# Patient Record
Sex: MALE | Race: ASIAN | NOT HISPANIC OR LATINO | URBAN - METROPOLITAN AREA
[De-identification: names, ages, dates, MRNs, and addresses within clinical notes are randomized per-mention and may not be internally consistent; named-entity substitution may affect disease eponyms.]

---
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
## 2021-03-16 ENCOUNTER — EMERGENCY (EMERGENCY)
Facility: HOSPITAL | Age: 21
LOS: 0 days | Discharge: PSYCHIATRIC OR SUBSTANCE USE FACILITY | End: 2021-03-17
Attending: EMERGENCY MEDICINE
Payer: COMMERCIAL

## 2021-03-16 VITALS
RESPIRATION RATE: 17 BRPM | OXYGEN SATURATION: 100 % | TEMPERATURE: 98 F | HEART RATE: 98 BPM | HEIGHT: 70 IN | SYSTOLIC BLOOD PRESSURE: 134 MMHG | WEIGHT: 145.06 LBS | DIASTOLIC BLOOD PRESSURE: 79 MMHG

## 2021-03-16 DIAGNOSIS — F60.3 BORDERLINE PERSONALITY DISORDER: ICD-10-CM

## 2021-03-16 DIAGNOSIS — F29 UNSPECIFIED PSYCHOSIS NOT DUE TO A SUBSTANCE OR KNOWN PHYSIOLOGICAL CONDITION: ICD-10-CM

## 2021-03-16 DIAGNOSIS — Z91.010 ALLERGY TO PEANUTS: ICD-10-CM

## 2021-03-16 DIAGNOSIS — Z91.012 ALLERGY TO EGGS: ICD-10-CM

## 2021-03-16 DIAGNOSIS — F03.90 UNSPECIFIED DEMENTIA WITHOUT BEHAVIORAL DISTURBANCE: ICD-10-CM

## 2021-03-16 DIAGNOSIS — Z20.822 CONTACT WITH AND (SUSPECTED) EXPOSURE TO COVID-19: ICD-10-CM

## 2021-03-16 DIAGNOSIS — F90.9 ATTENTION-DEFICIT HYPERACTIVITY DISORDER, UNSPECIFIED TYPE: ICD-10-CM

## 2021-03-16 DIAGNOSIS — F84.0 AUTISTIC DISORDER: ICD-10-CM

## 2021-03-16 DIAGNOSIS — F30.9 MANIC EPISODE, UNSPECIFIED: ICD-10-CM

## 2021-03-16 DIAGNOSIS — F63.9 IMPULSE DISORDER, UNSPECIFIED: ICD-10-CM

## 2021-03-16 DIAGNOSIS — H54.61 UNQUALIFIED VISUAL LOSS, RIGHT EYE, NORMAL VISION LEFT EYE: ICD-10-CM

## 2021-03-16 LAB
ALBUMIN SERPL ELPH-MCNC: 3.7 G/DL — SIGNIFICANT CHANGE UP (ref 3.3–5)
ALP SERPL-CCNC: 152 U/L — HIGH (ref 40–120)
ALT FLD-CCNC: 46 U/L — SIGNIFICANT CHANGE UP (ref 12–78)
ANION GAP SERPL CALC-SCNC: 5 MMOL/L — SIGNIFICANT CHANGE UP (ref 5–17)
APAP SERPL-MCNC: < 2 UG/ML (ref 10–30)
APPEARANCE UR: CLEAR — SIGNIFICANT CHANGE UP
AST SERPL-CCNC: 29 U/L — SIGNIFICANT CHANGE UP (ref 15–37)
BASOPHILS # BLD AUTO: 0.01 K/UL — SIGNIFICANT CHANGE UP (ref 0–0.2)
BASOPHILS NFR BLD AUTO: 0.1 % — SIGNIFICANT CHANGE UP (ref 0–2)
BILIRUB SERPL-MCNC: 0.3 MG/DL — SIGNIFICANT CHANGE UP (ref 0.2–1.2)
BILIRUB UR-MCNC: NEGATIVE — SIGNIFICANT CHANGE UP
BUN SERPL-MCNC: 12 MG/DL — SIGNIFICANT CHANGE UP (ref 7–23)
CALCIUM SERPL-MCNC: 9.2 MG/DL — SIGNIFICANT CHANGE UP (ref 8.5–10.1)
CHLORIDE SERPL-SCNC: 103 MMOL/L — SIGNIFICANT CHANGE UP (ref 96–108)
CO2 SERPL-SCNC: 29 MMOL/L — SIGNIFICANT CHANGE UP (ref 22–31)
COLOR SPEC: YELLOW — SIGNIFICANT CHANGE UP
CREAT SERPL-MCNC: 0.7 MG/DL — SIGNIFICANT CHANGE UP (ref 0.5–1.3)
DIFF PNL FLD: NEGATIVE — SIGNIFICANT CHANGE UP
EOSINOPHIL # BLD AUTO: 0.35 K/UL — SIGNIFICANT CHANGE UP (ref 0–0.5)
EOSINOPHIL NFR BLD AUTO: 3.9 % — SIGNIFICANT CHANGE UP (ref 0–6)
ETHANOL SERPL-MCNC: <10 MG/DL — SIGNIFICANT CHANGE UP (ref 0–10)
GLUCOSE SERPL-MCNC: 77 MG/DL — SIGNIFICANT CHANGE UP (ref 70–99)
GLUCOSE UR QL: NEGATIVE — SIGNIFICANT CHANGE UP
HCT VFR BLD CALC: 41.3 % — SIGNIFICANT CHANGE UP (ref 39–50)
HGB BLD-MCNC: 14.1 G/DL — SIGNIFICANT CHANGE UP (ref 13–17)
IMM GRANULOCYTES NFR BLD AUTO: 0.3 % — SIGNIFICANT CHANGE UP (ref 0–1.5)
KETONES UR-MCNC: NEGATIVE — SIGNIFICANT CHANGE UP
LEUKOCYTE ESTERASE UR-ACNC: NEGATIVE — SIGNIFICANT CHANGE UP
LYMPHOCYTES # BLD AUTO: 2.07 K/UL — SIGNIFICANT CHANGE UP (ref 1–3.3)
LYMPHOCYTES # BLD AUTO: 23.2 % — SIGNIFICANT CHANGE UP (ref 13–44)
MCHC RBC-ENTMCNC: 32.2 PG — SIGNIFICANT CHANGE UP (ref 27–34)
MCHC RBC-ENTMCNC: 34.1 GM/DL — SIGNIFICANT CHANGE UP (ref 32–36)
MCV RBC AUTO: 94.3 FL — SIGNIFICANT CHANGE UP (ref 80–100)
MONOCYTES # BLD AUTO: 1.18 K/UL — HIGH (ref 0–0.9)
MONOCYTES NFR BLD AUTO: 13.2 % — SIGNIFICANT CHANGE UP (ref 2–14)
NEUTROPHILS # BLD AUTO: 5.27 K/UL — SIGNIFICANT CHANGE UP (ref 1.8–7.4)
NEUTROPHILS NFR BLD AUTO: 59.3 % — SIGNIFICANT CHANGE UP (ref 43–77)
NITRITE UR-MCNC: NEGATIVE — SIGNIFICANT CHANGE UP
PCP SPEC-MCNC: SIGNIFICANT CHANGE UP
PH UR: 6 — SIGNIFICANT CHANGE UP (ref 5–8)
PLATELET # BLD AUTO: 211 K/UL — SIGNIFICANT CHANGE UP (ref 150–400)
POTASSIUM SERPL-MCNC: 3.9 MMOL/L — SIGNIFICANT CHANGE UP (ref 3.5–5.3)
POTASSIUM SERPL-SCNC: 3.9 MMOL/L — SIGNIFICANT CHANGE UP (ref 3.5–5.3)
PROT SERPL-MCNC: 7.8 GM/DL — SIGNIFICANT CHANGE UP (ref 6–8.3)
PROT UR-MCNC: NEGATIVE — SIGNIFICANT CHANGE UP
RBC # BLD: 4.38 M/UL — SIGNIFICANT CHANGE UP (ref 4.2–5.8)
RBC # FLD: 12.9 % — SIGNIFICANT CHANGE UP (ref 10.3–14.5)
SALICYLATES SERPL-MCNC: <1.7 MG/DL — LOW (ref 2.8–20)
SARS-COV-2 RNA SPEC QL NAA+PROBE: SIGNIFICANT CHANGE UP
SODIUM SERPL-SCNC: 137 MMOL/L — SIGNIFICANT CHANGE UP (ref 135–145)
SP GR SPEC: 1.01 — SIGNIFICANT CHANGE UP (ref 1.01–1.02)
UROBILINOGEN FLD QL: NEGATIVE — SIGNIFICANT CHANGE UP
VALPROATE SERPL-MCNC: 72 UG/ML — SIGNIFICANT CHANGE UP (ref 50–100)
WBC # BLD: 8.91 K/UL — SIGNIFICANT CHANGE UP (ref 3.8–10.5)
WBC # FLD AUTO: 8.91 K/UL — SIGNIFICANT CHANGE UP (ref 3.8–10.5)

## 2021-03-16 PROCEDURE — 85025 COMPLETE CBC W/AUTO DIFF WBC: CPT

## 2021-03-16 PROCEDURE — 81003 URINALYSIS AUTO W/O SCOPE: CPT

## 2021-03-16 PROCEDURE — 96372 THER/PROPH/DIAG INJ SC/IM: CPT | Mod: XU

## 2021-03-16 PROCEDURE — 80164 ASSAY DIPROPYLACETIC ACD TOT: CPT

## 2021-03-16 PROCEDURE — 80053 COMPREHEN METABOLIC PANEL: CPT

## 2021-03-16 PROCEDURE — 99285 EMERGENCY DEPT VISIT HI MDM: CPT

## 2021-03-16 PROCEDURE — 80307 DRUG TEST PRSMV CHEM ANLYZR: CPT

## 2021-03-16 PROCEDURE — 90792 PSYCH DIAG EVAL W/MED SRVCS: CPT | Mod: 95

## 2021-03-16 PROCEDURE — U0005: CPT

## 2021-03-16 PROCEDURE — 99285 EMERGENCY DEPT VISIT HI MDM: CPT | Mod: 25

## 2021-03-16 PROCEDURE — U0003: CPT

## 2021-03-16 PROCEDURE — 36415 COLL VENOUS BLD VENIPUNCTURE: CPT

## 2021-03-16 RX ORDER — DIVALPROEX SODIUM 500 MG/1
500 TABLET, DELAYED RELEASE ORAL ONCE
Refills: 0 | Status: DISCONTINUED | OUTPATIENT
Start: 2021-03-16 | End: 2021-03-16

## 2021-03-16 RX ORDER — HALOPERIDOL DECANOATE 100 MG/ML
5 INJECTION INTRAMUSCULAR ONCE
Refills: 0 | Status: COMPLETED | OUTPATIENT
Start: 2021-03-16 | End: 2021-03-16

## 2021-03-16 RX ORDER — CHLORPROMAZINE HCL 10 MG
200 TABLET ORAL ONCE
Refills: 0 | Status: DISCONTINUED | OUTPATIENT
Start: 2021-03-16 | End: 2021-03-16

## 2021-03-16 RX ADMIN — HALOPERIDOL DECANOATE 5 MILLIGRAM(S): 100 INJECTION INTRAMUSCULAR at 21:48

## 2021-03-16 RX ADMIN — Medication 2 MILLIGRAM(S): at 21:48

## 2021-03-16 NOTE — ED BEHAVIORAL HEALTH ASSESSMENT NOTE - NS ED BHA PLAN PSYCHIATRIC ISSUES2 FT
would continue VPA and chlorpromazine, defer further changes/additions pending discussion with outpatient psychiatrist as patient has had many med trials and has marked polypharmacy at this time with possible SE (?parkinsonism re shuffling gait)
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
## 2021-03-16 NOTE — ED ADULT NURSE NOTE - NS ED NURSE RECORD ANOTHER VITAL SIGN
Problem: DISCHARGE PLANNING - CARE MANAGEMENT  Goal: Discharge to post-acute care or home with appropriate resources  INTERVENTIONS:  - Conduct assessment to determine patient/family and health care team treatment goals, and need for post-acute services based on payer coverage, community resources, and patient preferences, and barriers to discharge  - Address psychosocial, clinical, and financial barriers to discharge as identified in assessment in conjunction with the patient/family and health care team  - Arrange appropriate level of post-acute services according to patient's   needs and preference and payer coverage in collaboration with the physician and health care team  - Communicate with and update the patient/family, physician, and health care team regarding progress on the discharge plan  - Arrange appropriate transportation to post-acute venues  Return to home independently  Outcome: Progressing Yes
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
## 2021-03-16 NOTE — ED BEHAVIORAL HEALTH ASSESSMENT NOTE - OTHER
special ed program restless defer wearing hospital gown, multiple times uncovering trunk and genitalia apparently unaware during interview
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
## 2021-03-16 NOTE — ED BEHAVIORAL HEALTH ASSESSMENT NOTE - RISK ASSESSMENT
chronic rf: male, ADHD, impulsivity  acute rf: increased irritability, recent outbursts, HI  pf: family support Low Acute Suicide Risk
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
## 2021-03-16 NOTE — ED BEHAVIORAL HEALTH ASSESSMENT NOTE - CURRENT MEDICATION
CoQ10, probiotic, Depakote 500 mg BID, olanzapine 2.5 mg BID, chlorpromazine 200 mg qhs, Haldol 1 mg qhs

## 2021-03-16 NOTE — ED PROVIDER NOTE - UNABLE TO OBTAIN
Dementia Complete history unobtainable secondary to pt Hx of Autism. ROS unobtainable secondary to pt Hx of Autism.
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
## 2021-03-16 NOTE — ED ADULT NURSE REASSESSMENT NOTE - NS ED NURSE REASSESS COMMENT FT1
belongings brought to security as per protocol. patient wanded by security. 1:1 maintained for patient safety.
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
## 2021-03-16 NOTE — ED BEHAVIORAL HEALTH ASSESSMENT NOTE - ADDITIONAL DETAILS ALL
Patient left message on nurse triage line asking questions on medication.     Returned patient phone call. Reviewed using insulin syringes to inject microdose lupron and 3 ml for menopur. Patient states she dropped one menopur vials on the ground and it broke. Patient aware she may have to purchase another one but will wait until later in the cycle.    HPI
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
## 2021-03-16 NOTE — ED ADULT NURSE NOTE - NSIMPLEMENTINTERV_GEN_ALL_ED
Implemented All Universal Safety Interventions:  [place] to call system. Call bell, personal items and telephone within reach. Instruct patient to call for assistance. Room bathroom lighting operational. Non-slip footwear when patient is off stretcher. Physically safe environment: no spills, clutter or unnecessary equipment. Stretcher in lowest position, wheels locked, appropriate side rails in place.

## 2021-03-17 ENCOUNTER — INPATIENT (INPATIENT)
Facility: HOSPITAL | Age: 21
LOS: 8 days | Discharge: HOME | End: 2021-03-26
Attending: PSYCHIATRY & NEUROLOGY | Admitting: PSYCHIATRY & NEUROLOGY
Payer: COMMERCIAL

## 2021-03-17 VITALS
SYSTOLIC BLOOD PRESSURE: 112 MMHG | OXYGEN SATURATION: 100 % | TEMPERATURE: 97 F | DIASTOLIC BLOOD PRESSURE: 62 MMHG | HEART RATE: 80 BPM | RESPIRATION RATE: 16 BRPM

## 2021-03-17 VITALS
TEMPERATURE: 97 F | HEART RATE: 106 BPM | DIASTOLIC BLOOD PRESSURE: 66 MMHG | RESPIRATION RATE: 18 BRPM | SYSTOLIC BLOOD PRESSURE: 107 MMHG

## 2021-03-17 DIAGNOSIS — F29 UNSPECIFIED PSYCHOSIS NOT DUE TO A SUBSTANCE OR KNOWN PHYSIOLOGICAL CONDITION: ICD-10-CM

## 2021-03-17 DIAGNOSIS — F33.9 MAJOR DEPRESSIVE DISORDER, RECURRENT, UNSPECIFIED: ICD-10-CM

## 2021-03-17 PROBLEM — F84.0 AUTISTIC DISORDER: Chronic | Status: ACTIVE | Noted: 2018-01-13

## 2021-03-17 PROCEDURE — 99222 1ST HOSP IP/OBS MODERATE 55: CPT

## 2021-03-17 RX ORDER — OLANZAPINE 15 MG/1
2.5 TABLET, FILM COATED ORAL
Refills: 0 | Status: DISCONTINUED | OUTPATIENT
Start: 2021-03-17 | End: 2021-03-17

## 2021-03-17 RX ORDER — OLANZAPINE 15 MG/1
2.5 TABLET, FILM COATED ORAL ONCE
Refills: 0 | Status: COMPLETED | OUTPATIENT
Start: 2021-03-17 | End: 2021-03-17

## 2021-03-17 RX ORDER — DIVALPROEX SODIUM 500 MG/1
500 TABLET, DELAYED RELEASE ORAL
Refills: 0 | Status: DISCONTINUED | OUTPATIENT
Start: 2021-03-17 | End: 2021-03-18

## 2021-03-17 RX ORDER — OLANZAPINE 15 MG/1
5 TABLET, FILM COATED ORAL
Refills: 0 | Status: DISCONTINUED | OUTPATIENT
Start: 2021-03-17 | End: 2021-03-22

## 2021-03-17 RX ORDER — CHLORPROMAZINE HCL 10 MG
200 TABLET ORAL AT BEDTIME
Refills: 0 | Status: DISCONTINUED | OUTPATIENT
Start: 2021-03-17 | End: 2021-03-26

## 2021-03-17 RX ORDER — DIVALPROEX SODIUM 500 MG/1
500 TABLET, DELAYED RELEASE ORAL ONCE
Refills: 0 | Status: COMPLETED | OUTPATIENT
Start: 2021-03-17 | End: 2021-03-17

## 2021-03-17 RX ORDER — CHLORPROMAZINE HCL 10 MG
100 TABLET ORAL ONCE
Refills: 0 | Status: COMPLETED | OUTPATIENT
Start: 2021-03-17 | End: 2021-03-17

## 2021-03-17 RX ORDER — CHLORPROMAZINE HCL 10 MG
50 TABLET ORAL ONCE
Refills: 0 | Status: DISCONTINUED | OUTPATIENT
Start: 2021-03-17 | End: 2021-03-26

## 2021-03-17 RX ORDER — CHLORPROMAZINE HCL 10 MG
50 TABLET ORAL EVERY 4 HOURS
Refills: 0 | Status: DISCONTINUED | OUTPATIENT
Start: 2021-03-17 | End: 2021-03-26

## 2021-03-17 RX ADMIN — Medication 50 MILLIGRAM(S): at 18:11

## 2021-03-17 RX ADMIN — Medication 2 MILLIGRAM(S): at 18:12

## 2021-03-17 RX ADMIN — OLANZAPINE 5 MILLIGRAM(S): 15 TABLET, FILM COATED ORAL at 20:42

## 2021-03-17 RX ADMIN — Medication 2 MILLIGRAM(S): at 11:00

## 2021-03-17 RX ADMIN — Medication 50 MILLIGRAM(S): at 09:35

## 2021-03-17 RX ADMIN — OLANZAPINE 2.5 MILLIGRAM(S): 15 TABLET, FILM COATED ORAL at 09:40

## 2021-03-17 RX ADMIN — Medication 200 MILLIGRAM(S): at 20:42

## 2021-03-17 RX ADMIN — Medication 3 MILLIGRAM(S): at 13:51

## 2021-03-17 RX ADMIN — DIVALPROEX SODIUM 500 MILLIGRAM(S): 500 TABLET, DELAYED RELEASE ORAL at 20:42

## 2021-03-17 RX ADMIN — DIVALPROEX SODIUM 500 MILLIGRAM(S): 500 TABLET, DELAYED RELEASE ORAL at 09:35

## 2021-03-17 RX ADMIN — Medication 100 MILLIGRAM(S): at 13:51

## 2021-03-17 RX ADMIN — OLANZAPINE 5 MILLIGRAM(S): 15 TABLET, FILM COATED ORAL at 11:00

## 2021-03-17 NOTE — BH INPATIENT PSYCHIATRY ASSESSMENT NOTE - NSDCCRITERIA_PSY_ALL_CORE
When pt is no longer an acute or imminent risk of harm to self or others, and is able to care for self safely, pt may then be discharged.

## 2021-03-17 NOTE — BH INPATIENT PSYCHIATRY ASSESSMENT NOTE - MODIFICATIONS
Modifications were made to above student note where appropriate and/or are addressed below in case summary.
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
## 2021-03-17 NOTE — BH PATIENT PROFILE - NSINDCRISISSIGNS_PSY_ALL_CORE
Becoming argumentative/Slamming doors/Clenching fists/Pacing/Scratching self/Clenching jaw/Punching walls/Self-injury/Yelling

## 2021-03-17 NOTE — BH INPATIENT PSYCHIATRY ASSESSMENT NOTE - CASE SUMMARY
20M w/pphx of autism, OCD, tics.  Admitted with worsening behaviors for months leading to aggression in home.  Awaiting collateral to formulate treatment plan.
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
## 2021-03-17 NOTE — PSYCHIATRIC REHAB INITIAL EVALUATION - NSBHPRRECOMMEND_PSY_ALL_CORE
Writer attempted to meet with patient in order to orient patient to unit, and introduce patient to psychiatric staff and department functions. However, writer is completing this assessment from chart due to patient's symptomatology at the time of this session. Writer selected an appropriate psychiatric rehabilitation goal. Psychiatric rehabilitation staff will continue to engage patient daily in order to develop therapeutic rapport. In response to COVID19, unit programming will be re-evaluated on a consistent basis in effort to maintain safety guidelines.

## 2021-03-17 NOTE — BH INPATIENT PSYCHIATRY ASSESSMENT NOTE - CURRENT MEDICATION
MEDICATIONS  (STANDING):  chlorproMAZINE    Tablet 200 milliGRAM(s) Oral at bedtime  diVALproex  milliGRAM(s) Oral two times a day  OLANZapine 5 milliGRAM(s) Oral two times a day    MEDICATIONS  (PRN):  chlorproMAZINE    Injectable 50 milliGRAM(s) IntraMuscular once PRN agitation  chlorproMAZINE    Tablet 50 milliGRAM(s) Oral every 4 hours PRN agitation  LORazepam     Tablet 2 milliGRAM(s) Oral every 4 hours PRN anxiety/agitation  LORazepam   Injectable 2 milliGRAM(s) IntraMuscular once PRN agitation

## 2021-03-17 NOTE — BH INPATIENT PSYCHIATRY ASSESSMENT NOTE - PAST PSYCHOTROPIC MEDICATION
-past med trials: Topamax, intuniv, Prozac, propranolol, ambien, luvox (discontinued on 3/14/21 for gait disturbance)  -current meds: CoQ10, probiotic, Depakote 500 mg BID, olanzapine 2.5 mg BID, chlorpromazine 200 mg qhs, Haldol 1 mg qhs
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
## 2021-03-17 NOTE — BH PATIENT PROFILE - NSELOPEMENTRISKBEHAVE_PSY_ALL_CORE
Verbal statements of intent to leave the unit without permission/Making attempts to escape from the unit/Checking the doors and/or windows/Watching the exit doors

## 2021-03-17 NOTE — PSYCHIATRIC REHAB INITIAL EVALUATION - NSBHALCSUBTREAT_PSY_ALL_CORE
Per chart, patient was currently receiving outpatient therapy, and was taking psychotropic medication prior to admission

## 2021-03-17 NOTE — BH INPATIENT PSYCHIATRY ASSESSMENT NOTE - NSBHCHARTREVIEWVS_PSY_A_CORE FT
Vital Signs Last 24 Hrs  T(C): 36.2 (17 Mar 2021 09:09), Max: 36.7 (16 Mar 2021 23:48)  T(F): 97.1 (17 Mar 2021 09:09), Max: 98.1 (17 Mar 2021 01:23)  HR: 106 (17 Mar 2021 09:09) (62 - 106)  BP: 107/66 (17 Mar 2021 09:09) (107/66 - 134/79)  BP(mean): --  RR: 18 (17 Mar 2021 09:09) (15 - 20)  SpO2: 100% (17 Mar 2021 07:57) (100% - 100%)

## 2021-03-17 NOTE — BH INPATIENT PSYCHIATRY ASSESSMENT NOTE - MSE UNSTRUCTURED FT
Appearance: well groomed in hospital gown, appears stated age  Behavior: does not make eye contact, hunched over in bed eating with hands, guarded  Speech: soft volume, increased latency, occasional verbal tic  Motor: psychomotor agitation, no gait disturbance observed  Mood: dysphoric  Affect: anxious, agitated,  constricted   Thought Process: preservation of thought (going home)  Thought Content: denies SI, HI, AH/VH, delusions  Judgement: poor  Insight: poor  Impulse control: tenuous      Appearance: well groomed in hospital gown, appears stated age  Behavior: does not make eye contact, hunched over in bed eating with hands, guarded  Speech: soft volume, increased latency, occasional verbal tic  Motor: psychomotor agitation, gait disturbance observed ambulating on unit   Mood: dysphoric  Affect: anxious, agitated,  constricted   Thought Process: preservation of thought (going home)  Thought Content: denies SI, HI, AH/VH, delusions  Judgement: poor  Insight: poor  Impulse control: tenuous

## 2021-03-17 NOTE — BH CHART NOTE - NSEVENTNOTEFT_PSY_ALL_CORE
Pt began screaming, banging on nurses station window.  Then became physically combative with [facility] and responding staff, pt was pushing, punching, spitting.  Unable to redirect.  4 Point restraints at 13:40.  Chlorpromazine and Ativan IMs.    Pt in restraints continues to yell that he wants to go home and to call father.  No circulatory compromise noted in 4 extremities.  Pt respirating adequately.

## 2021-03-17 NOTE — BH INPATIENT PSYCHIATRY ASSESSMENT NOTE - OTHER PAST PSYCHIATRIC HISTORY (INCLUDE DETAILS REGARDING ONSET, COURSE OF ILLNESS, INPATIENT/OUTPATIENT TREATMENT)
PPHx:  -dx: ASD (dx around age 2), OCD, tics (vocal screams, hits self)  -hx learning disability  -years ago became “manic” and aggressive, went to [place], was discharged, got worse, brought back and was eventually admitted at [facility] for med changes

## 2021-03-17 NOTE — BH PATIENT PROFILE - NSPROSPIRITUALVALUESFT_GEN_A_NUR
Patient is religiously focused/delusional , feels that he is the Hinduism Messiah, verbalizes paranoid ideation about the [employer] during nursing assessment.
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
## 2021-03-17 NOTE — BH PATIENT PROFILE - HOME MEDICATIONS
amoxicillin-clavulanate 875 mg-125 mg oral tablet , 1 tab(s) orally 2 times a day  Zoloft 25 mg oral tablet , 1 tab(s) orally once a day  Abilify 5 mg oral tablet , 1 tab(s) orally once a day  Intuniv 4 mg oral tablet, extended release , 1 tab(s) orally once a day (in the morning)  Topamax 50 mg oral tablet , 1 tab(s) orally 2 times a day

## 2021-03-17 NOTE — BH INPATIENT PSYCHIATRY ASSESSMENT NOTE - VIOLENCE RISK FACTORS:
Feeling of being under threat and being unable to control threat/History of violence prior to age 18/Violent ideation/threat/speech/Affective dysregulation/Impulsivity/Lack of insight into violence risk/need for treatment

## 2021-03-17 NOTE — BH INPATIENT PSYCHIATRY ASSESSMENT NOTE - NSBHMETABOLIC_PSY_ALL_CORE_FT
BMI: BMI (kg/m2): 20.8 (03-16-21 @ 17:28)  HbA1c:   Glucose:   BP: 107/66 (03-17-21 @ 09:09) (107/66 - 107/66)  Lipid Panel:

## 2021-03-18 PROCEDURE — 99232 SBSQ HOSP IP/OBS MODERATE 35: CPT

## 2021-03-18 RX ORDER — DIVALPROEX SODIUM 500 MG/1
750 TABLET, DELAYED RELEASE ORAL
Refills: 0 | Status: DISCONTINUED | OUTPATIENT
Start: 2021-03-18 | End: 2021-03-26

## 2021-03-18 RX ORDER — CHLORPROMAZINE HCL 10 MG
100 TABLET ORAL ONCE
Refills: 0 | Status: COMPLETED | OUTPATIENT
Start: 2021-03-18 | End: 2021-03-18

## 2021-03-18 RX ADMIN — OLANZAPINE 5 MILLIGRAM(S): 15 TABLET, FILM COATED ORAL at 09:48

## 2021-03-18 RX ADMIN — Medication 2 MILLIGRAM(S): at 18:13

## 2021-03-18 RX ADMIN — DIVALPROEX SODIUM 500 MILLIGRAM(S): 500 TABLET, DELAYED RELEASE ORAL at 09:48

## 2021-03-18 RX ADMIN — Medication 3 MILLIGRAM(S): at 15:27

## 2021-03-18 RX ADMIN — Medication 50 MILLIGRAM(S): at 09:48

## 2021-03-18 RX ADMIN — Medication 50 MILLIGRAM(S): at 13:52

## 2021-03-18 RX ADMIN — Medication 2 MILLIGRAM(S): at 12:41

## 2021-03-18 RX ADMIN — Medication 100 MILLIGRAM(S): at 15:27

## 2021-03-18 NOTE — BH SOCIAL WORK INITIAL PSYCHOSOCIAL EVALUATION - LEGAL HELP
PHONE VISIT    I tried to connect with pt twice virtually but EMR read \"Hmm we're having trouble connecting you. Reload the page before trying again. \" Per pt, she has received 5 text invites but EMR says that I am offline. Once we did connect, there was no audio. Consent:  She and/or health care decision maker is aware that that she may receive a bill for this telephone service, depending on her insurance coverage, and has provided verbal consent to proceed: Yes    HISTORY OF PRESENT ILLNESS  Harjit Vences is a 46 y.o. female presents with Ear Pain (about a week now) and Sinus Pain    Agree with nurse note. Pt was evaluated via telephone. Pt complains of sinus congestion, sinus pressure behind the R eye, yellow nasal drainage, post nasal drainage, itchy nose, sneezing, and R ear pain x 1 week. She takes Zyrtec daily, Singulair 10 mg qPM, and Astelin BID. She uses albuterol hfa, Pulmicort, and Duo-Neb prn. She is not able to take her temperature today. Patient denies fever, chills, dizziness, sore throat, headache, itchy or watery eyes, chest pain or tightness, SOB, wheezing, cough, GI symptoms, bladder symptoms and body aches. She notes her sister was just dx'd with an ear infection and the doctor was surprised her ear drum did not burst. She is concerned she will get an ear infection like this. She has an allergy to sulfa drugs and penicillin. She takes Doxy daily for acne rosacea. Pt requests a refill of Atarax 25 mg for pruritic dermatitis. She takes 2-3 pills a day. Her bottle says 0 refills. Per chart review, a year supply was sent on 12/26/2019. Written by tracy Alvares, as dictated by DO. IMER Brunson    Review of Systems negative except as noted above in HPI.     ALLERGIES:    Allergies   Allergen Reactions    Latex Rash    Junel 1.5/30 (21) [Norethindrone Ac-Eth Estradiol] Swelling     L FOOT DUE TO NEW DVT    Other Medication Hives and Rash     SUN  Pcn [Penicillins] Hives    Prednisone Hives    Sulfa (Sulfonamide Antibiotics) Hives    Garamycin [Gentamicin] Other (comments)     Itchy eyes due to sulfate    Metformin Diarrhea       CURRENT MEDICATIONS:    Outpatient Medications Marked as Taking for the 5/6/20 encounter (Virtual Visit) with Asia Stock, DO   Medication Sig Dispense Refill    hydrOXYzine HCL (ATARAX) 25 mg tablet Take 2 Tabs by mouth every eight (8) hours as needed (urticaria). Indications: hives 270 Tab 3    doxycycline (ADOXA) 100 mg tablet Take 1 Tab by mouth two (2) times a day for 7 days. Indications: inflammation of the tissue lining the sinuses 14 Tab 0    NAFTIN 2 % gel Use as directed  Indications: athlete's foot 60 g 2    clobetasol (TEMOVATE) 0.05 % topical cream as needed. 1    albuterol (PROVENTIL HFA, VENTOLIN HFA, PROAIR HFA) 90 mcg/actuation inhaler Take 2 Puffs by inhalation every four (4) hours as needed for Wheezing. Indications: bronchospasm prevention 3 Inhaler 1    levothyroxine (SYNTHROID) 125 mcg tablet Take 1 Tab by mouth Daily (before breakfast). Indications: a condition with low thyroid hormone levels 90 Tab 3    albuterol-ipratropium (DUO-NEB) 2.5 mg-0.5 mg/3 ml nebu 3 mL by Nebulization route every six (6) hours as needed (breathing). 30 Nebule 5    montelukast (SINGULAIR) 10 mg tablet Take 1 Tab by mouth daily. 90 Tab 3    azelastine (ASTELIN) 137 mcg (0.1 %) nasal spray 2 Sprays by Both Nostrils route two (2) times a day. Use in each nostril as directed  Indications: Seasonal Runny Nose 3 Bottle 1    omeprazole (PRILOSEC) 40 mg capsule TAKE 1 CAPSULE BY MOUTH EVERY DAY 90 Cap 1    butalbital-acetaminophen-caffeine (FIORICET, ESGIC) -40 mg per tablet TAKE 1 TAB BY MOUTH EVERY SIX (6) HOURS AS NEEDED FOR PAIN OR HEADACHE. 40 Tab 0    Nebulizer & Compressor machine 1 Each by Other route four (4) times daily as needed.  1 Each 0    albuterol (PROVENTIL VENTOLIN) 2.5 mg /3 mL (0.083 %) nebulizer solution 6 mL by Nebulization route every four (4) hours as needed for Wheezing or Shortness of Breath. Indications: Acute Asthma Attack 24 Each 1    budesonide (PULMICORT) 1 mg/2 mL nbsp 2 mL by Nebulization route two (2) times a day. Indications: MAINTENANCE THERAPY FOR ASTHMA (Patient taking differently: 1,000 mcg by Nebulization route two (2) times daily as needed.) 1 Each 1    cyclobenzaprine (FLEXERIL) 10 mg tablet TAKE 1 TABLET BY MOUTH 3 TIMES A DAY AS NEEDED FOR MUSCLE SPASM 90 Tab 0    ALPRAZolam (XANAX) 0.5 mg tablet Take 1 Tab by mouth two (2) times daily as needed for Anxiety or Sleep. Max Daily Amount: 1 mg. Indications: anxiety 60 Tab 2    terconazole (TERAZOL 7) 0.4 % vaginal cream   6    LOCOID 0.1 % lotn   1    valACYclovir (VALTREX) 500 mg tablet   1    terconazole (TERAZOL 3) 80 mg vaginal suppository   4    multivitamin (ONE A DAY) tablet Take 1 tablet by mouth daily.  PROPYLENE GLYCOL//PF (SYSTANE, PF, OP) Apply  to eye four (4) times daily.  Emollient Combination No.32 (EPICERAM) Emul by Apply Externally route two (2) times a day. From Dr Brianne Carvajal. For Acne rosacea.  metroNIDAZOLE (METROGEL) 1 % topical gel Apply  to affected area two (2) times a day. Use a thin layer to affected areas after washing from Dr Brianne Carvajal   Indications: a skin condition on the cheeks and nose with a reddish rash and acne called acne rosacea      latanoprost (XALATAN) 0.005 % ophthalmic solution Administer 1 Drop to both eyes nightly.  LACTOBACILLUS/FOS/PECTIN (PROBIOTIC COMPLEX PO) Take  by mouth daily.  cholecalciferol, vitamin d3, (VITAMIN D) 1,000 unit tablet Take 1,000 Units by mouth daily. PAST MEDICAL HISTORY:    Past Medical History:   Diagnosis Date    Acne rosacea     Dr. Brianne Carvajal.  Allergy, unspecified not elsewhere classified childhood     Txd with immunotherapy.   Dr. Bere Tang Anemia NEC     borderline    Ankle sprain 2007    Right. Dr. Srinivasan Folres    Ankle sprain 11/2012    Right. Dr. Júnior Yang.  Asthma childhood    Chickenpox childhood    Chronic low back pain with right-sided sciatica     and SI joint dysfunction. Dr. Isael De.  Chronic otitis media     Dr. Ck Gannon Dry eye syndrome 2013    Dr. Buck Sandoval.  DVT of deep femoral vein ([address] 12/07/2018    Acute occlusive DVT of L deep femoral vein and Acute partially occlusive DVT of L Common Fem Vein and L Prox Fem Vein. Txd with Xarelto x 3 months. due to BCP. Dr. Mondragon May EBV infection 6/27/2011    Hearing loss     Mild high frequency sensorineuronal hearing loss. Dr. Tl Dougherty murmur     Hernia of abdominal wall 09/2003, 64/7129    umbilical.  Dr. Annamarie Hawthorne. Dr. Dena Charles    Hyperglycemia 2013    Hypothyroidism 06/2010    Dr. Grace Tello pressure increase 12/2011    Dr. Abi Bailey. Dr. Buck Sandoval. Dr. Destiny Correia.  Knee pain, left 04/2012    Left. Dr. Rocio Shaffer Measles childhood    Migraine 2017, 07/2019    w aura and facial paresthesia. Dr. Дмитрий Lopez    Mumps childhood    Plantar fasciitis, bilateral 2010    Dr. Acey Regan Ryleigh.Caldron Sciatica 2008    Right.  with OA. Dr. Laina Oliveira Tinnitus of right ear 2012    Dr. Verta Holstein:    Past Surgical History:   Procedure Laterality Date   Ion Vazquez  2011    Dr. Tez Curran.  HAND/FINGER SURGERY UNLISTED  09/2003    Right Index finger repair due to cut    HX HERNIA REPAIR  90/1263    Umbilical.  Dr. Annamarie Hawthorne.  HX HERNIA REPAIR  9/23/2011    recurrent umbilical.  Laparoscopy incisional.  Dr. Dena Charles.     HX TONSILLECTOMY  childhood    LAP,CHOLECYSTECTOMY  07/2001       FAMILY HISTORY:    Family History   Problem Relation Age of Onset    Hypertension Mother     Cancer Mother         small cell lung with bone mets to spine/MELANOMA    Cancer Father         melanoma    Arthritis-osteo Father     Colon Polyps Father     Diabetes Maternal Grandmother     Stroke Maternal Grandmother     Seizures Maternal Grandmother     Colon Polyps Maternal Grandmother     Breast Cancer Maternal Grandmother     Heart Disease Paternal Grandmother         heart failure    Heart Attack Paternal Grandmother     Asthma Paternal Grandmother     Depression Sister     Other Maternal Grandfather         Jose Dopp Ds       SOCIAL HISTORY:    Social History     Socioeconomic History    Marital status: SINGLE     Spouse name: Not on file    Number of children: Not on file    Years of education: Not on file    Highest education level: Not on file   Tobacco Use    Smoking status: Passive Smoke Exposure - Never Smoker    Smokeless tobacco: Never Used    Tobacco comment: lived with smoker dad and mom then step mom x 20 yrs   Substance and Sexual Activity    Alcohol use: Yes     Alcohol/week: 1.0 standard drinks     Types: 1 Glasses of wine per week     Frequency: Monthly or less     Drinks per session: 1 or 2     Binge frequency: Never     Comment: RARE    Drug use: No    Sexual activity: Yes     Partners: Male     Birth control/protection: Condom   Lifestyle    Physical activity     Days per week: 4 days     Minutes per session: 30 min    Stress:  Only a little       IMMUNIZATIONS:    Immunization History   Administered Date(s) Administered    Influenza Vaccine 10/15/2013, 11/07/2016, 11/03/2017, 10/01/2019    Influenza Vaccine (Quad) 11/03/2015    Influenza Vaccine (Quad) PF 11/20/2014, 11/07/2016, 11/12/2018    Influenza Vaccine Split 11/18/2011, 11/07/2012    Influenza Vaccine Whole 10/01/2010    TD Vaccine 09/01/2003    Tdap 11/03/2015         PHYSICAL EXAMINATION    Vital Signs    Visit Vitals  Ht 5' 1\" (1.549 m)   Wt 297 lb 12.8 oz (135.1 kg)   BMI 56.27 kg/m²       Weight Metrics 5/6/2020 12/4/2019 11/15/2019 7/30/2019 7/8/2019 7/2/2019 4/26/2019   Weight 297 lb 12.8 oz 298 lb 8 oz 300 lb 6.4 oz 300 lb 301 lb 303 lb 4.8 oz 312 lb   BMI 56.27 kg/m2 56.36 kg/m2 56.76 kg/m2 56.68 kg/m2 56.87 kg/m2 57.31 kg/m2 58.95 kg/m2       Unable to assess over the phone. DATA REVIEWED    Lab Results   Component Value Date/Time    WBC 7.7 11/08/2019 10:36 AM    HGB 12.1 11/08/2019 10:36 AM    HCT 37.5 11/08/2019 10:36 AM    PLATELET 448 20/74/8060 10:36 AM    MCV 89 11/08/2019 10:36 AM     Lab Results   Component Value Date/Time    Sodium 138 11/08/2019 10:36 AM    Potassium 4.7 11/08/2019 10:36 AM    Chloride 102 11/08/2019 10:36 AM    CO2 22 11/08/2019 10:36 AM    Anion gap 10 09/24/2011 02:30 AM    Glucose 89 11/08/2019 10:36 AM    BUN 13 11/08/2019 10:36 AM    Creatinine 0.71 11/08/2019 10:36 AM    BUN/Creatinine ratio 18 11/08/2019 10:36 AM    GFR est  11/08/2019 10:36 AM    GFR est non-AA 99 11/08/2019 10:36 AM    Calcium 8.8 11/08/2019 10:36 AM    Bilirubin, total 0.3 11/08/2019 10:36 AM    AST (SGOT) 15 11/08/2019 10:36 AM    Alk. phosphatase 108 11/08/2019 10:36 AM    Protein, total 6.3 11/08/2019 10:36 AM    Albumin 4.2 11/08/2019 10:36 AM    Globulin 3.4 05/19/2010 10:46 AM    A-G Ratio 2.0 11/08/2019 10:36 AM    ALT (SGPT) 23 11/08/2019 10:36 AM     Lab Results   Component Value Date/Time    Cholesterol, total 179 11/08/2019 10:36 AM    HDL Cholesterol 66 11/08/2019 10:36 AM    LDL, calculated 102 (H) 11/08/2019 10:36 AM    VLDL, calculated 11 11/08/2019 10:36 AM    Triglyceride 54 11/08/2019 10:36 AM    CHOL/HDL Ratio 2.5 05/19/2010 10:46 AM     Lab Results   Component Value Date/Time    Vitamin D 25-Hydroxy 32.2 08/11/2011 08:15 AM    VITAMIN D, 25-HYDROXY 27.5 (L) 11/08/2019 10:36 AM       Lab Results   Component Value Date/Time    Hemoglobin A1c 5.8 (H) 11/08/2019 10:36 AM     Lab Results   Component Value Date/Time    TSH 1.860 11/08/2019 10:36 AM     ASSESSMENT and PLAN      ICD-10-CM ICD-9-CM    1.  Acute non-recurrent sinusitis of other sinus J01.80 461.8 doxycycline (ADOXA) 100 mg tablet   2. Pruritic dermatitis L29.9 698.9 hydrOXYzine HCL (ATARAX) 25 mg tablet   3. Acne rosacea L71.9 695.3     stable on Doxy 50 mg daily and facial gels   4. Allergy to sulfa drugs Z88.2 V14.2    5. History of penicillin allergy Z88.0 V14.0        Discussed the patient's BMI with her. The BMI follow up plan is as follows: I have counseled this patient on diet and exercise regimens. Decrease carbohydrates (white foods, sweet foods, sweet drinks and alcohol), increase green leafy vegetables and protein (lean meats and beans) with each meal.  Avoid fried foods. Eat 3-5 small meals daily. Do not skip meals. Increase water intake. Increase physical activity to 30 minutes daily for health benefit or 60 minutes daily to prevent weight regain, as tolerated. Get 7-8 hours uninterrupted sleep nightly. Avoid sugar and dairy while congested. Increase water and rest.    Chart reviewed and updated. Continue current medications and care. Instructed her to wait until next week to start the antibiotic to see if sinus symptoms resolve. If needed, start doxycycline 100 mg BID x 7 days for sinusitis. She takes doxycycline daily for acne rosacea so instructed her to stop this if she starts the higher dose. Asked nurse to call pt after the visit to inform her of the potential interaction of taking magnesium with doxycycline and so to stop magnesium while on the antibiotic. Prescriptions written and sent to pharmacy; medication side effects discussed. Atarax 25 mg  Counseled patient on health concerns:  Sinusitis, congestion protocol, allergy protocol, dermatitis  Relevant handouts given and discussed with patient. Immunizations noted. Offered empathy, support, legitimation, prayers, partnership to patient. Praised patient for progress. Follow-up and Dispositions    · Return if symptoms worsen or fail to improve.          Encouraged the pt to sign up for [facility] to be able to view results and send me any questions or concerns prior to the next visit where we will go over results in detail. Patient was offered a choice/choices in the treatment plan today. Patient expresses understanding of the plan and agrees with recommendations. 22 mins spent on the telephone with patient and more than 50% of this time spent in counseling and coordinating care. Written by tracy Murray, as dictated by Dana Hardy DO. Documentation True and Accepted by Dilip Foley. Roshan Schuler.     Note: not billable if this call serves to triage the patient into an appointment for the relevant concern no

## 2021-03-18 NOTE — BH INPATIENT PSYCHIATRY PROGRESS NOTE - NSBHFUPINTERVALHXFT_PSY_A_CORE
Pt this morning on phone with father, observed slamming phone.  Interview very limited, pt just repeats "when can I go home."

## 2021-03-18 NOTE — BH INPATIENT PSYCHIATRY PROGRESS NOTE - MSE UNSTRUCTURED FT
Dressed appropriately in gowns.  Poorly related.  Childlike.  Speech laconic.  Unable assess mood.  Affect dysphoric.  Repetitive thought process.  Limited associations.  TC: Fixated on discharge.  Insight/judgment poor, attention limited, gait intact, language fluent.

## 2021-03-18 NOTE — CHART NOTE - NSCHARTNOTEFT_GEN_A_CORE
Screening Medical Evaluation  Patient Admitted from: [facility] ED    [facility] admitting diagnosis: Recurrent major depressive disorder    PAST MEDICAL & SURGICAL HISTORY:  Autism          Allergies    eggs (Unknown)  No Known Drug Allergies  peanuts (Unknown)    Intolerances        Social History: denies    FAMILY HISTORY:      MEDICATIONS  (STANDING):  chlorproMAZINE    Tablet 200 milliGRAM(s) Oral at bedtime  diVALproex  milliGRAM(s) Oral two times a day  OLANZapine 5 milliGRAM(s) Oral two times a day    MEDICATIONS  (PRN):  chlorproMAZINE    Injectable 50 milliGRAM(s) IntraMuscular once PRN agitation  chlorproMAZINE    Tablet 50 milliGRAM(s) Oral every 4 hours PRN agitation  LORazepam     Tablet 2 milliGRAM(s) Oral every 4 hours PRN anxiety/agitation  LORazepam   Injectable 2 milliGRAM(s) IntraMuscular once PRN agitation      Vital Signs Last 24 Hrs  T(C): 37.3 (18 Mar 2021 17:21), Max: 37.3 (18 Mar 2021 17:21)  T(F): 99.2 (18 Mar 2021 17:21), Max: 99.2 (18 Mar 2021 17:21)  HR: 103 (18 Mar 2021 09:49) (103 - 103)  BP: 135/80 (18 Mar 2021 09:49) (135/80 - 135/80)  BP(mean): --  RR: --  SpO2: --  CAPILLARY BLOOD GLUCOSE            PHYSICAL EXAM:  GENERAL: NAD, well-developed  HEAD:  Atraumatic, Normocephalic  EYES: RIGHT: blind. LEFT: EOMI, PERRLA, conjunctiva and sclera clear  NECK: Supple, No JVD  CHEST/LUNG: Clear to auscultation bilaterally; No wheeze  HEART: Regular rate and rhythm; No murmurs, rubs, or gallops  ABDOMEN: Soft, Nontender, Nondistended; Bowel sounds present  EXTREMITIES:  2+ Peripheral Pulses, No clubbing, cyanosis, or edema  PSYCH: AAOx3  NEUROLOGY: non-focal  SKIN: No rashes or lesions    LABS:                    RADIOLOGY & ADDITIONAL TESTS:    Assessment and Plan:    19 yo M w/ PMHX of autism and tics presents to [facility] with an admitting diagnosis of Recurrent major depressive disorder. Patient has no medical complaints at this time including nausea, vomiting, fevers, diarrhea, cough, SOB, chest pain, abdominal pain, dysuria, HA, dizziness    1. Recurrent major depressive disorder- plan per primary team   2. Autism - plan per primary team   3. Tics - plan per primary team
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
## 2021-03-19 PROCEDURE — 99231 SBSQ HOSP IP/OBS SF/LOW 25: CPT

## 2021-03-19 RX ADMIN — Medication 50 MILLIGRAM(S): at 11:44

## 2021-03-19 RX ADMIN — Medication 200 MILLIGRAM(S): at 20:05

## 2021-03-19 RX ADMIN — OLANZAPINE 5 MILLIGRAM(S): 15 TABLET, FILM COATED ORAL at 20:05

## 2021-03-19 RX ADMIN — OLANZAPINE 5 MILLIGRAM(S): 15 TABLET, FILM COATED ORAL at 11:44

## 2021-03-19 RX ADMIN — Medication 200 MILLIGRAM(S): at 03:30

## 2021-03-19 RX ADMIN — DIVALPROEX SODIUM 750 MILLIGRAM(S): 500 TABLET, DELAYED RELEASE ORAL at 20:05

## 2021-03-19 RX ADMIN — DIVALPROEX SODIUM 750 MILLIGRAM(S): 500 TABLET, DELAYED RELEASE ORAL at 03:30

## 2021-03-19 RX ADMIN — OLANZAPINE 5 MILLIGRAM(S): 15 TABLET, FILM COATED ORAL at 03:31

## 2021-03-19 RX ADMIN — DIVALPROEX SODIUM 750 MILLIGRAM(S): 500 TABLET, DELAYED RELEASE ORAL at 11:43

## 2021-03-19 RX ADMIN — Medication 2 MILLIGRAM(S): at 14:31

## 2021-03-19 NOTE — BH INPATIENT PSYCHIATRY PROGRESS NOTE - NSBHFUPINTERVALHXFT_PSY_A_CORE
No overnight events.  Today pt has been running around unit.  Asks writer if he has spoken to pt father.  Asked if he can use game room.

## 2021-03-19 NOTE — CHART NOTE - NSCHARTNOTEFT_GEN_A_CORE
Nutrition note:  Consult ordered d/t patient request to speak to RDN.   Patient admitted to [facility] d/t increasing aggression.  Diet: Regular.  Patient with dx: ASD. On CO 1:1.  Unable to interview patient as declining to answer RDN's questions. States " I want to go home. As per staff, po intake is fair. Allergy to peanuts and eggs documented in medical record.  No reported GI distress. Expressed to staff that if patient has specific food preferences, they can be faxed to food and nutrition department.  RDN remains available prn.  Jane Tirado MS RDN  Pager #[phone]

## 2021-03-19 NOTE — BH INPATIENT PSYCHIATRY PROGRESS NOTE - MSE UNSTRUCTURED FT
Dressed in own clothes.  Poor cooperation.  Still oddly related.  Speech repetitive.  Unable assess mood.  Affect neutral.  Repetitive thought process, limited associations.  TC: Ruminating on discharge.  Insight and judgment poor, attention limited, gait intact, language fluent.

## 2021-03-20 PROCEDURE — 99231 SBSQ HOSP IP/OBS SF/LOW 25: CPT

## 2021-03-20 RX ADMIN — Medication 50 MILLIGRAM(S): at 14:21

## 2021-03-20 RX ADMIN — DIVALPROEX SODIUM 750 MILLIGRAM(S): 500 TABLET, DELAYED RELEASE ORAL at 08:34

## 2021-03-20 RX ADMIN — Medication 2 MILLIGRAM(S): at 20:33

## 2021-03-20 RX ADMIN — Medication 50 MILLIGRAM(S): at 08:34

## 2021-03-20 RX ADMIN — Medication 2 MILLIGRAM(S): at 13:10

## 2021-03-20 RX ADMIN — DIVALPROEX SODIUM 750 MILLIGRAM(S): 500 TABLET, DELAYED RELEASE ORAL at 20:34

## 2021-03-20 RX ADMIN — OLANZAPINE 5 MILLIGRAM(S): 15 TABLET, FILM COATED ORAL at 20:33

## 2021-03-20 RX ADMIN — OLANZAPINE 5 MILLIGRAM(S): 15 TABLET, FILM COATED ORAL at 08:36

## 2021-03-20 RX ADMIN — Medication 200 MILLIGRAM(S): at 20:33

## 2021-03-20 NOTE — BH INPATIENT PSYCHIATRY PROGRESS NOTE - NSBHFUPINTERVALHXFT_PSY_A_CORE
No overnight events.   Patient has been taking meds with staff support  Asking about playing with Games on unit

## 2021-03-20 NOTE — BH INPATIENT PSYCHIATRY PROGRESS NOTE - MSE UNSTRUCTURED FT
On exam today the patient is superficially cooperative with questions.    Speech is clear and of normal rate.  Thought process: perseverative.    Thought content: with poverty of content.   Perception: Denies hallucinations.  Mood: Describes as "OK".  Affect: constricted.    Patient unable to fully participate in questions about SI/HI.   Patient is Alert and oriented x 1   Insight and judgment are limited. Impulse control is intact at this time.

## 2021-03-21 PROCEDURE — 99231 SBSQ HOSP IP/OBS SF/LOW 25: CPT

## 2021-03-21 RX ORDER — DUPILUMAB 300 MG/2ML
300 INJECTION, SOLUTION SUBCUTANEOUS ONCE
Refills: 0 | Status: COMPLETED | OUTPATIENT
Start: 2021-03-21 | End: 2021-03-21

## 2021-03-21 RX ORDER — DIPHENHYDRAMINE HCL 50 MG
50 CAPSULE ORAL ONCE
Refills: 0 | Status: COMPLETED | OUTPATIENT
Start: 2021-03-21 | End: 2021-03-23

## 2021-03-21 RX ORDER — DIPHENHYDRAMINE HCL 50 MG
50 CAPSULE ORAL ONCE
Refills: 0 | Status: DISCONTINUED | OUTPATIENT
Start: 2021-03-21 | End: 2021-03-21

## 2021-03-21 RX ADMIN — DIVALPROEX SODIUM 750 MILLIGRAM(S): 500 TABLET, DELAYED RELEASE ORAL at 20:38

## 2021-03-21 RX ADMIN — Medication 50 MILLIGRAM(S): at 10:31

## 2021-03-21 RX ADMIN — Medication 50 MILLIGRAM(S): at 22:00

## 2021-03-21 RX ADMIN — Medication 2 MILLIGRAM(S): at 22:01

## 2021-03-21 RX ADMIN — Medication 2 MILLIGRAM(S): at 13:47

## 2021-03-21 RX ADMIN — OLANZAPINE 5 MILLIGRAM(S): 15 TABLET, FILM COATED ORAL at 10:32

## 2021-03-21 RX ADMIN — Medication 2 MILLIGRAM(S): at 18:00

## 2021-03-21 RX ADMIN — OLANZAPINE 5 MILLIGRAM(S): 15 TABLET, FILM COATED ORAL at 20:37

## 2021-03-21 RX ADMIN — Medication 50 MILLIGRAM(S): at 15:58

## 2021-03-21 RX ADMIN — Medication 200 MILLIGRAM(S): at 20:37

## 2021-03-21 RX ADMIN — DUPILUMAB 300 MILLIGRAM(S): 300 INJECTION, SOLUTION SUBCUTANEOUS at 11:02

## 2021-03-21 RX ADMIN — DIVALPROEX SODIUM 750 MILLIGRAM(S): 500 TABLET, DELAYED RELEASE ORAL at 10:32

## 2021-03-21 NOTE — BH INPATIENT PSYCHIATRY PROGRESS NOTE - MSE UNSTRUCTURED FT
On exam today the patient is tired, in bed and superficially cooperative with questions.    Speech is clear and of normal rate.  Thought process: perseverative.    Thought content: with poverty of content.   Perception: Denies hallucinations.  Mood: Describes as "OK".  Affect: constricted.    Patient unable to fully participate in questions about SI/HI.   Patient is Alert and oriented x 1   Insight and judgment are limited. Impulse control is intact at this time.

## 2021-03-21 NOTE — BH INPATIENT PSYCHIATRY PROGRESS NOTE - NSBHFUPINTERVALHXFT_PSY_A_CORE
Patient seen for follow up of Autism  Patient tired and not talkative  No overnight events.   Patient has been taking meds with staff support

## 2021-03-22 PROCEDURE — 99231 SBSQ HOSP IP/OBS SF/LOW 25: CPT

## 2021-03-22 RX ORDER — OLANZAPINE 15 MG/1
5 TABLET, FILM COATED ORAL THREE TIMES A DAY
Refills: 0 | Status: DISCONTINUED | OUTPATIENT
Start: 2021-03-22 | End: 2021-03-26

## 2021-03-22 RX ADMIN — Medication 50 MILLIGRAM(S): at 18:35

## 2021-03-22 RX ADMIN — Medication 200 MILLIGRAM(S): at 21:53

## 2021-03-22 RX ADMIN — Medication 50 MILLIGRAM(S): at 08:13

## 2021-03-22 RX ADMIN — Medication 2 MILLIGRAM(S): at 21:40

## 2021-03-22 RX ADMIN — DIVALPROEX SODIUM 750 MILLIGRAM(S): 500 TABLET, DELAYED RELEASE ORAL at 08:13

## 2021-03-22 RX ADMIN — OLANZAPINE 5 MILLIGRAM(S): 15 TABLET, FILM COATED ORAL at 21:54

## 2021-03-22 RX ADMIN — DIVALPROEX SODIUM 750 MILLIGRAM(S): 500 TABLET, DELAYED RELEASE ORAL at 21:53

## 2021-03-22 RX ADMIN — Medication 2 MILLIGRAM(S): at 14:39

## 2021-03-22 RX ADMIN — OLANZAPINE 5 MILLIGRAM(S): 15 TABLET, FILM COATED ORAL at 08:14

## 2021-03-22 NOTE — BH INPATIENT PSYCHIATRY PROGRESS NOTE - NSBHFUPINTERVALHXFT_PSY_A_CORE
Patient seen and examined. Chart reviewed. Over weekend patient received PO PRN medications (Ativan and Thorazine), no IMs administered. Wanted to spend time in game room. Per staff in relative behavioral control (not hitting self or others, minimal yelling). Today, patient asking for team to speak to father by 3/30. Patient seen ambulating the unit in the morning after waking up. Patient eating/sleeping ok.

## 2021-03-22 NOTE — BH INPATIENT PSYCHIATRY PROGRESS NOTE - MSE UNSTRUCTURED FT
On exam today the patient appears tired, seen up on unit, superficially cooperative with questions.    Speech is clear and of normal rate.  Thought process: perseverative.    Thought content: with poverty of content.  Perception: Denies hallucinations.  Mood: Describes as "OK".  Affect: constricted.    Patient unable to fully participate in questions about SI/HI.   Patient is Alert and oriented x 1   Insight and judgment are limited. Impulse control is intact at this time.

## 2021-03-22 NOTE — BH INPATIENT PSYCHIATRY PROGRESS NOTE - CASE SUMMARY
Better behavioral control over weekend, no IMs required.  Unclear frequency of vocal tics, at most maybe 1-2x an hour though most of day, appears to be less than this.

## 2021-03-23 PROCEDURE — 99231 SBSQ HOSP IP/OBS SF/LOW 25: CPT

## 2021-03-23 RX ADMIN — Medication 2 MILLIGRAM(S): at 20:16

## 2021-03-23 RX ADMIN — OLANZAPINE 5 MILLIGRAM(S): 15 TABLET, FILM COATED ORAL at 08:08

## 2021-03-23 RX ADMIN — Medication 50 MILLIGRAM(S): at 08:07

## 2021-03-23 RX ADMIN — Medication 200 MILLIGRAM(S): at 20:15

## 2021-03-23 RX ADMIN — OLANZAPINE 5 MILLIGRAM(S): 15 TABLET, FILM COATED ORAL at 20:16

## 2021-03-23 RX ADMIN — Medication 50 MILLIGRAM(S): at 21:02

## 2021-03-23 RX ADMIN — DIVALPROEX SODIUM 750 MILLIGRAM(S): 500 TABLET, DELAYED RELEASE ORAL at 08:07

## 2021-03-23 RX ADMIN — OLANZAPINE 5 MILLIGRAM(S): 15 TABLET, FILM COATED ORAL at 12:28

## 2021-03-23 RX ADMIN — DIVALPROEX SODIUM 750 MILLIGRAM(S): 500 TABLET, DELAYED RELEASE ORAL at 20:15

## 2021-03-23 RX ADMIN — Medication 1 MILLIGRAM(S): at 21:02

## 2021-03-23 NOTE — BH INPATIENT PSYCHIATRY PROGRESS NOTE - NSBHFUPINTERVALHXFT_PSY_A_CORE
Patient seen and examined. Chart reviewed. Less oral PRNs given yesterday (2 times vs 4 on Sunday). Per staff today, patient asks about leaving and speaking to primary team, states pt in relative behavioral control (redirectable). Patient states he is feeling "fine" today. Was talking on phone with family. Asked when he can go home. Goes back to room after stating team will be calling his family regarding plans.

## 2021-03-23 NOTE — BH INPATIENT PSYCHIATRY PROGRESS NOTE - MSE UNSTRUCTURED FT
On exam today the patient appears tired, seen up on unit at meal time and occasionally on phones, superficially cooperative with questions.    Speech is clear and of normal rate.  Thought process: perseverative.    Thought content: with poverty of content.  Perception: Denies hallucinations.  Mood: Describes as "fine".  Affect: constricted.    Patient unable to fully participate in questions about SI/HI.   Patient is Alert and oriented x 1   Insight and judgment are limited. Impulse control is intact at this time.      Patient stumbled after rising quickly from bench after phone-call, caught self, apologized and walked to room.

## 2021-03-24 PROCEDURE — 99231 SBSQ HOSP IP/OBS SF/LOW 25: CPT

## 2021-03-24 RX ORDER — DIPHENHYDRAMINE HCL 50 MG
50 CAPSULE ORAL ONCE
Refills: 0 | Status: COMPLETED | OUTPATIENT
Start: 2021-03-24 | End: 2021-03-24

## 2021-03-24 RX ORDER — DIPHENHYDRAMINE HCL 50 MG
50 CAPSULE ORAL ONCE
Refills: 0 | Status: COMPLETED | OUTPATIENT
Start: 2021-03-24 | End: 2021-03-25

## 2021-03-24 RX ADMIN — OLANZAPINE 5 MILLIGRAM(S): 15 TABLET, FILM COATED ORAL at 20:48

## 2021-03-24 RX ADMIN — Medication 200 MILLIGRAM(S): at 20:48

## 2021-03-24 RX ADMIN — Medication 50 MILLIGRAM(S): at 18:04

## 2021-03-24 RX ADMIN — Medication 2 MILLIGRAM(S): at 10:20

## 2021-03-24 RX ADMIN — OLANZAPINE 5 MILLIGRAM(S): 15 TABLET, FILM COATED ORAL at 08:30

## 2021-03-24 RX ADMIN — Medication 50 MILLIGRAM(S): at 08:18

## 2021-03-24 RX ADMIN — DIVALPROEX SODIUM 750 MILLIGRAM(S): 500 TABLET, DELAYED RELEASE ORAL at 20:47

## 2021-03-24 RX ADMIN — OLANZAPINE 5 MILLIGRAM(S): 15 TABLET, FILM COATED ORAL at 14:55

## 2021-03-24 RX ADMIN — Medication 50 MILLIGRAM(S): at 10:20

## 2021-03-24 RX ADMIN — DIVALPROEX SODIUM 750 MILLIGRAM(S): 500 TABLET, DELAYED RELEASE ORAL at 08:18
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
## 2021-03-24 NOTE — BH INPATIENT PSYCHIATRY PROGRESS NOTE - CASE SUMMARY
Pt after having 2 relatively calm days, had agitation this morning related to interpersonal interactions with father and unmet demands.  Acting out behaviors characteristic of pt's ASD, continue to anticipate limited benefit from psychotropics.

## 2021-03-24 NOTE — BH INPATIENT PSYCHIATRY PROGRESS NOTE - MSE UNSTRUCTURED FT
On exam today the patient appears more alert but agitated, initially seen up on unit at meal time and occasionally on phones, superficially cooperative with questions. While interviewing another patient, patient observed flipping tables and pounding on glass while asking for provider's attention.     Speech is clear and of normal rate.  Thought process: perseverative on going home and speaking with father.    Thought content: with poverty of content.  Perception: Denies hallucinations.  Mood: Describes as "fine".  Affect: constricted.    Patient unable to fully participate in questions about SI/HI.   Patient is Alert and oriented x 1   Insight and judgment are limited. Impulse control is impaired at this time.       Behavior: On exam today the patient appears more alert but psychomotor activated, superficially cooperative with questions.    Speech is clear and of normal rate.    Thought process: perseverative, repetitive  Thought content: ruminates on going home and speaking with father.    Mood: Describes as "fine".  Affect: constricted.    Patient is Alert and oriented x 1   Insight and judgment are limited.   Impulse control is impaired at this time.

## 2021-03-25 LAB — VALPROATE SERPL-MCNC: 97.5 UG/ML — SIGNIFICANT CHANGE UP (ref 50–100)

## 2021-03-25 PROCEDURE — 99231 SBSQ HOSP IP/OBS SF/LOW 25: CPT

## 2021-03-25 RX ORDER — OLANZAPINE 15 MG/1
1 TABLET, FILM COATED ORAL
Qty: 90 | Refills: 0
Start: 2021-03-25 | End: 2021-04-23

## 2021-03-25 RX ORDER — DIVALPROEX SODIUM 500 MG/1
1 TABLET, DELAYED RELEASE ORAL
Qty: 60 | Refills: 0
Start: 2021-03-25 | End: 2021-04-23

## 2021-03-25 RX ORDER — ARIPIPRAZOLE 15 MG/1
1 TABLET ORAL
Qty: 0 | Refills: 0 | DISCHARGE

## 2021-03-25 RX ORDER — TOPIRAMATE 25 MG
1 TABLET ORAL
Qty: 0 | Refills: 0 | DISCHARGE

## 2021-03-25 RX ORDER — GUANFACINE 3 MG/1
1 TABLET, EXTENDED RELEASE ORAL
Qty: 0 | Refills: 0 | DISCHARGE

## 2021-03-25 RX ORDER — SERTRALINE 25 MG/1
1 TABLET, FILM COATED ORAL
Qty: 0 | Refills: 0 | DISCHARGE

## 2021-03-25 RX ORDER — CHLORPROMAZINE HCL 10 MG
1 TABLET ORAL
Qty: 30 | Refills: 0
Start: 2021-03-25 | End: 2021-04-23

## 2021-03-25 RX ADMIN — OLANZAPINE 5 MILLIGRAM(S): 15 TABLET, FILM COATED ORAL at 13:39

## 2021-03-25 RX ADMIN — Medication 2 MILLIGRAM(S): at 17:42

## 2021-03-25 RX ADMIN — Medication 50 MILLIGRAM(S): at 08:13

## 2021-03-25 RX ADMIN — Medication 50 MILLIGRAM(S): at 00:13

## 2021-03-25 RX ADMIN — Medication 2 MILLIGRAM(S): at 00:13

## 2021-03-25 RX ADMIN — OLANZAPINE 5 MILLIGRAM(S): 15 TABLET, FILM COATED ORAL at 08:13

## 2021-03-25 RX ADMIN — Medication 200 MILLIGRAM(S): at 20:21

## 2021-03-25 RX ADMIN — OLANZAPINE 5 MILLIGRAM(S): 15 TABLET, FILM COATED ORAL at 20:20

## 2021-03-25 RX ADMIN — DIVALPROEX SODIUM 750 MILLIGRAM(S): 500 TABLET, DELAYED RELEASE ORAL at 08:13

## 2021-03-25 RX ADMIN — DIVALPROEX SODIUM 750 MILLIGRAM(S): 500 TABLET, DELAYED RELEASE ORAL at 20:21

## 2021-03-25 RX ADMIN — Medication 50 MILLIGRAM(S): at 17:42

## 2021-03-25 NOTE — BH TREATMENT PLAN - NSTXIMPULSGOAL_PSY_ALL_CORE
Will be able to demonstrate the ability to pause before acting out negatively
Will be able to demonstrate the ability to pause before acting out negatively

## 2021-03-25 NOTE — BH TREATMENT PLAN - NSTXIMPULSINTERPR_PSY_ALL_CORE
Patient’s psychiatric rehabilitation goal is to meet with staff individually and attend psychiatric rehabilitation groups, in order for patient to be able to demonstrate impulse control for better symptom management and sustain recovery within 7 days.
Patient’s psychiatric rehabilitation goal is to meet with staff individually and attend psychiatric rehabilitation groups, in order for patient to be able to demonstrate impulse control for better symptom management and sustain recovery within 7 days.

## 2021-03-25 NOTE — BH INPATIENT PSYCHIATRY PROGRESS NOTE - MSE UNSTRUCTURED FT
Dressed appropriately, better related.  Childlike.  Speech laconic.  Mood is "good", affect smiling, improved range.  [place] TP, fair associations.  TC: Ruminations on going home.  Insight limited, judgment improved, language fluent, gait intact.

## 2021-03-25 NOTE — BH TREATMENT PLAN - NSTXVIOLNTGOAL_PSY_ALL_CORE
Will be able to express understanding of at least one trigger to their aggressive behavior
Will be able to express understanding of at least one trigger to their aggressive behavior

## 2021-03-25 NOTE — BH DISCHARGE NOTE NURSING/SOCIAL WORK/PSYCH REHAB - NSDCPRRECOMMEND_PSY_ALL_CORE
Psychiatric rehabilitation staff recommends patient will benefit from seeing Dr. Cachorro Everett (Psychiatrist) for medication management, support, and psychotherapy.

## 2021-03-25 NOTE — BH DISCHARGE NOTE NURSING/SOCIAL WORK/PSYCH REHAB - PATIENT PORTAL LINK FT
You can access the FollowMyHealth Patient Portal offered by [facility] by registering at the following website: [email or web address]. By joining [employer]’s FollowMyHealth portal, you will also be able to view your health information using other applications (apps) compatible with our system.

## 2021-03-25 NOTE — BH DISCHARGE NOTE NURSING/SOCIAL WORK/PSYCH REHAB - NSBHREFERPURPOSE1_PSY_ALL_CORE
Please note, you also have a  with [facility] that you should contact post discharge: Britney Espinosa: [phone]/Mental Health Treatment Dr. Everett will contact you directly to inform you if this is an in-person or telehealth appt.     Please note, you also have a  with [facility] that you should contact post discharge: Britney Espinosa: [phone]/Mental Health Treatment

## 2021-03-25 NOTE — BH DISCHARGE NOTE NURSING/SOCIAL WORK/PSYCH REHAB - NSCDUDCCRISIS_PSY_A_CORE
[facility]  1 [phone]) [facility]-WELL ([phone])  Text "WELL" to [phone]  Website: www.[employer]/.[employer] 1 (565) 711-CXBX (9235) Website: www.safehorizon.org/.National Suicide Prevention Lifeline 4 (657) 367-1143/.  [employer]  1 (996) [employer] (360-4162)/.  [facility]’s [facility] Crisis [facility]  [phone] [place] [address] [phone]   Hours:  Monday through Friday from 9 AM to 3 PM/.  [employer]  [employer] - Veterans Crisis Line  [phone], Option 1

## 2021-03-25 NOTE — BH TREATMENT PLAN - NSDCCRITERIA_PSY_ALL_CORE
When pt is no longer an acute or imminent risk of harm to self or others, and is able to care for self safely, pt may then be discharged. 
When pt is no longer an acute or imminent risk of harm to self or others, and is able to care for self safely, pt may then be discharged.

## 2021-03-25 NOTE — BH TREATMENT PLAN - NSTXCAREGIVERPARTICIPATE_PSY_P_CORE
Family/Caregiver participated in identification of needs/problems/goals for treatment/Family/Caregiver participated in defining interventions
Family/Caregiver participated in identification of needs/problems/goals for treatment

## 2021-03-25 NOTE — BH INPATIENT PSYCHIATRY PROGRESS NOTE - NSBHFUPINTERVALHXFT_PSY_A_CORE
Pt today states he is doing "good" and gives thumbs up.  Is happy that father will be picking him up tomorrow.

## 2021-03-25 NOTE — BH TREATMENT PLAN - NSTXPLANTHERAPYSESSIONSFT_PSY_ALL_CORE
03-24-21  Type of therapy: Other  Type of session: Individual  Level of patient participation: Participated with encouragement  Duration of participation: 15 minutes  Therapy conducted by: Psych rehab  Therapy Summary: Writer met with patient for an individual session in order to review progress towards psychiatric rehabilitation goals. Pt has demonstrated progress towards his psych rehab goal related to demonstrate improvement in behavioral control. Writer met the pt in the game room when he was playing the video games. Pt was verbal and receptive to writer’s engagement. Pt reported that his doctor told him that he will be discharged this Friday. Pt stated that he is looking forward to going back home and seeing his cousin. Pt repetitively asked writer to confirm with his doctor to make sure that he will leave on Friday.  Pt has demonstrated better impulse control. Pt is able to redirect himself by reminding himself to take a deep breathe in when he is trying to reach out his hand to the writer. Overall, pt adheres to medications. Pt is calmer and more cooperative. Pt is noted playing video games in game room. Pt is able to verbalize his needs. Pt’s ADLs and appearance are fair. Pt does not attend any psych rehab groups. Writer will continue to encourage the pt to attend psych rehab groups for socialization and psychoeducation.

## 2021-03-25 NOTE — BH TREATMENT PLAN - NSTXDISORGGOAL_PSY_ALL_CORE
Will make at least 3 goal and reality oriented statements during therapy
Will make at least 3 goal and reality oriented statements during therapy

## 2021-03-25 NOTE — BH DISCHARGE NOTE NURSING/SOCIAL WORK/PSYCH REHAB - NSBHDCADDR1FT_A_CORE
Dr. Everett will contact you directly to inform you if this is an in-person or telehealth appt.  [address] [place] [address]

## 2021-03-26 VITALS — TEMPERATURE: 98 F

## 2021-03-26 PROCEDURE — 99238 HOSP IP/OBS DSCHRG MGMT 30/<: CPT

## 2021-03-26 RX ADMIN — Medication 2 MILLIGRAM(S): at 07:42

## 2021-03-26 RX ADMIN — Medication 50 MILLIGRAM(S): at 01:16

## 2021-03-26 RX ADMIN — Medication 2 MILLIGRAM(S): at 01:16

## 2021-03-26 RX ADMIN — OLANZAPINE 5 MILLIGRAM(S): 15 TABLET, FILM COATED ORAL at 08:26

## 2021-03-26 RX ADMIN — DIVALPROEX SODIUM 750 MILLIGRAM(S): 500 TABLET, DELAYED RELEASE ORAL at 08:26
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
## 2021-03-26 NOTE — BH INPATIENT PSYCHIATRY PROGRESS NOTE - NSBHFUPINTERVALCCFT_PSY_A_CORE
Autism	
"where is my dad?"
.
Patient seen for follow up of behavioral disturbances related to ASD.
Patient seen for follow up of behavioral disturbances related to ASD.  
Autism
Autism
Behavioral disturbances.
Autism	
Patient seen for follow up of behavioral disturbances related to ASD.

## 2021-03-26 NOTE — BH INPATIENT PSYCHIATRY PROGRESS NOTE - NSTXIMPULSDATETRGT_PSY_ALL_CORE
24-Mar-2021
31-Mar-2021
24-Mar-2021
31-Mar-2021

## 2021-03-26 NOTE — BH INPATIENT PSYCHIATRY DISCHARGE NOTE - NSDCMRMEDTOKEN_GEN_ALL_CORE_FT
chlorproMAZINE 200 mg oral tablet: 1 tab(s) orally once a day (at bedtime)  chlorproMAZINE 50 mg oral tablet: 1 tab(s) orally once a day, As Needed -agitation   divalproex sodium 250 mg oral delayed release tablet: 1 tab(s) orally 2 times a day   divalproex sodium 500 mg oral delayed release tablet: 1 tab(s) orally 2 times a day   LORazepam 2 mg oral tablet: 1 tab(s) orally once a day, As Needed -anxiety/agitation MDD:2mg/daily  OLANZapine 5 mg oral tablet: 1 tab(s) orally 3 times a day

## 2021-03-26 NOTE — BH INPATIENT PSYCHIATRY PROGRESS NOTE - NSTXVIOLNTGOAL_PSY_ALL_CORE
Will be able to express understanding of at least one trigger to their aggressive behavior

## 2021-03-26 NOTE — BH INPATIENT PSYCHIATRY PROGRESS NOTE - NSTXPROBIMPULS_PSY_ALL_CORE
IMPULSIVITY/AGITATION

## 2021-03-26 NOTE — BH INPATIENT PSYCHIATRY PROGRESS NOTE - NSBHFUPINTERVALHXFT_PSY_A_CORE
Pt today feeling "good,". Is looking forward to going home today. Asks writer to engage in breathing exercise with him. Reports he does not have any urges to hurt himself or anyone (in unit or at home). He notices his mask is down and lifts it saying it's good to social distance. Offers that if he feels mad at home he will hit his pillow and asks for a stress ball. He gives a thumbs up and returns to eating breakfast.

## 2021-03-26 NOTE — BH INPATIENT PSYCHIATRY PROGRESS NOTE - NSBHASSESSSUMMFT_PSY_ALL_CORE
Maintaining behavioral control.  Prior behavioral disturbances and presentation most consistent with autistic spectrum disorder. Patient stating ready for discharge, family in agreement.    Risk Assessment: The patient is at chronic risk of harm to self and others given history of agitation/aggression (in context of Autism Spectrum Disorder behavioral dysregulation). He has had prior psychiatric hospitalizations. Patient with R eye blindness.    Protective factors include history of medication adherence, familial support, in established outpatient mental health and neurologic care. Future oriented. Has CM via [facility], in school program.     On admission the patient was felt to be at an acutely elevated risk of harm to self or others given recent elevation in aggressive behavior at home. Over hospitalization, medications were adjusted, and patient was provided with coping strategies for stress at home including breathing exercises. Safety planning was conducted. With patient demonstrating improved insight and judgment around aggression.     Although patient has a chronic risk of harm to self or others given the above, it is felt that patient has improved to the capacity attainable by inpatient hospitalization. He is ready for transition to the outpatient level of care.         Patient will be discharged with the following DSM5 diagnoses:   	1. Autism Spectrum Disorder    Patient will be discharged on the following medications:   	1. Depakote DR - 750 mg BID  	2. Zyprexa - 5 mg TID  	3. Thorazine - 200 mg qHS  	4. Thorazine - 50 mg PRN  	5. Ativan - 2 mg PRN      1. Will d/c home on current regimen.    2. Psychoeducation provided regarding importance of compliance with outpatient appointments and medications.    3. Pt was advised to remain abstinent with substances and encouraged to continue using coping strategies.    4. Pt was advised to dial 911 or return to ER if they become danger to self or others
20M with historical diagnoses of autistic spectrum disorder and bipolar disorder, presentation is most consistent with former, some suspicion regarding latter diagnosis as any bipolarity could be actually sequelae of autism.  Same goes for tics/OCD comorbid historical diagnoses.    Continue olanzapine as ordered.  Consider increase in Depakote.
Behavioral disturbances related to ASD. Over weekend received oral PRNs, appears sedated this am, no further adjustments at this time in med regimen. Patient acclimating to unit with some improvements in behavioral issues (less yelling, apologizes after per staff).    Plan:  Continue meds as ordered.  mg, olanzapine 5 mg BID, Depakote 750 mg BID
Behavioral disturbances related to ASD. In effort to reduce Thorazine/Ativan PRN use (with side-effect burden in mind), Zyprexa regimen adjusted to TID from BID. Patient reported by staff to become agitated with more frequent/longer duration of tics requiring PRNs overnight and again this morning. PRNs with good effect. Patient continues to have preservative thought on going home by March 29th.     Plan:  Continue meds as ordered.  mg, olanzapine 5 mg TID, Depakote 750 mg BID  
Behavioral disturbances related to ASD.    Continue meds as ordered.  mg, olanzapine 5 mg BID, Depakote 750 mg BID
Behavioral disturbances related to ASD.  No restraints or IMs needed overnight.  COntinue meds as ordered.
In behavioral control, better related.  Prior behavioral disturbances and presentation most consistent with autistic spectrum disorder.    Continue meds.  Dispo planning.
Behavioral disturbances related to ASD.    Continue meds as ordered.  mg, olanzapine 5 mg BID, Depakote 750 mg BID
Behavioral disturbances related to ASD. Patient still with sedation, in effort to reduce Thorazine/Ativan PRN use (with side-effect burden in mind), Zyprexa regimen adjusted to TID from BID. May consider reducing Thorazine HS dose.     Plan:  Continue meds as ordered.  mg, olanzapine 5 mg TID, Depakote 750 mg BID

## 2021-03-26 NOTE — BH INPATIENT PSYCHIATRY PROGRESS NOTE - NSTXDISORGPROGRES_PSY_ALL_CORE
No Change
No Change
Met - goal discontinued
No Change

## 2021-03-26 NOTE — BH INPATIENT PSYCHIATRY PROGRESS NOTE - MSE UNSTRUCTURED FT
Dressed appropriately, better related.  Childlike.  Speech laconic.  Mood is "good", affect smiling, improved range.  [place] TP, fair associations.  TC: Ruminations on going home.  Insight limited, judgment improved, language fluent, gait intact.

## 2021-03-26 NOTE — BH INPATIENT PSYCHIATRY DISCHARGE NOTE - MODIFICATIONS
Modifications were made to above resident note where appropriate and/or are addressed below in case summary.
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
## 2021-03-26 NOTE — BH INPATIENT PSYCHIATRY PROGRESS NOTE - NSTXDCOPLKGOAL_PSY_ALL_CORE
Will agree to consider an appropriate level of outpatient care

## 2021-03-26 NOTE — BH INPATIENT PSYCHIATRY PROGRESS NOTE - MODIFICATIONS
Modifications were made to above resident note where appropriate and/or are addressed below in case summary. 
Modifications were made to above student note where appropriate and/or are addressed below in case summary.

## 2021-03-26 NOTE — BH INPATIENT PSYCHIATRY PROGRESS NOTE - NSBHCHARTREVIEWVS_PSY_A_CORE FT
Vital Signs Last 24 Hrs  T(C): 36.4 (24 Mar 2021 08:11), Max: 36.5 (23 Mar 2021 18:15)  T(F): 97.5 (24 Mar 2021 08:11), Max: 97.7 (23 Mar 2021 18:15)  HR: 100 (24 Mar 2021 08:11) (100 - 100)  BP: 125/76 (24 Mar 2021 08:11) (125/76 - 125/76)  BP(mean): --  RR: --  SpO2: --
Vital Signs Last 24 Hrs  T(C): 36.7 (22 Mar 2021 09:01), Max: 36.7 (22 Mar 2021 09:01)  T(F): 98.1 (22 Mar 2021 09:01), Max: 98.1 (22 Mar 2021 09:01)  HR: --  BP: --  BP(mean): --  RR: --  SpO2: --
Vital Signs Last 24 Hrs  T(C): 36.6 (20 Mar 2021 10:18), Max: 36.9 (19 Mar 2021 15:18)  T(F): 97.9 (20 Mar 2021 10:18), Max: 98.4 (19 Mar 2021 15:18)  HR: 105 (20 Mar 2021 10:18) (104 - 105)  BP: 107/58 (20 Mar 2021 10:18) (107/58 - 116/100)  BP(mean): --  RR: --  SpO2: --
Vital Signs Last 24 Hrs  T(C): 36.2 (23 Mar 2021 07:00), Max: 36.6 (22 Mar 2021 17:30)  T(F): 97.1 (23 Mar 2021 07:00), Max: 97.8 (22 Mar 2021 17:30)  HR: --  BP: --  BP(mean): --  RR: --  SpO2: --
Vital Signs Last 24 Hrs  T(C): 36.8 (18 Mar 2021 09:49), Max: 36.8 (17 Mar 2021 17:30)  T(F): 98.2 (18 Mar 2021 09:49), Max: 98.2 (17 Mar 2021 17:30)  HR: 103 (18 Mar 2021 09:49) (103 - 103)  BP: 135/80 (18 Mar 2021 09:49) (135/80 - 135/80)  BP(mean): --  RR: --  SpO2: --
Vital Signs Last 24 Hrs  T(C): 36.5 (21 Mar 2021 12:04), Max: 36.7 (21 Mar 2021 08:22)  T(F): 97.7 (21 Mar 2021 12:04), Max: 98.1 (21 Mar 2021 08:22)  HR: 83 (21 Mar 2021 08:22) (83 - 83)  BP: 128/78 (21 Mar 2021 12:04) (118/68 - 128/78)  BP(mean): --  RR: --  SpO2: --
Vital Signs Last 24 Hrs  T(C): 36.4 (26 Mar 2021 06:48), Max: 36.4 (25 Mar 2021 17:23)  T(F): 97.5 (26 Mar 2021 06:48), Max: 97.6 (25 Mar 2021 17:23)  HR: --  BP: --  BP(mean): --  RR: --  SpO2: --
Vital Signs Last 24 Hrs  T(C): 36.9 (19 Mar 2021 15:18), Max: 37.3 (18 Mar 2021 17:21)  T(F): 98.4 (19 Mar 2021 15:18), Max: 99.2 (18 Mar 2021 17:21)  HR: 104 (19 Mar 2021 15:18) (104 - 104)  BP: 116/100 (19 Mar 2021 15:18) (116/100 - 116/100)  BP(mean): --  RR: --  SpO2: --
Vital Signs Last 24 Hrs  T(C): 36.8 (25 Mar 2021 08:02), Max: 36.8 (25 Mar 2021 08:02)  T(F): 98.3 (25 Mar 2021 08:02), Max: 98.3 (25 Mar 2021 08:02)  HR: 104 (25 Mar 2021 08:02) (104 - 104)  BP: 132/79 (25 Mar 2021 08:02) (132/79 - 132/79)  BP(mean): --  RR: --  SpO2: --

## 2021-03-26 NOTE — BH INPATIENT PSYCHIATRY DISCHARGE NOTE - NSBHMETABOLIC_PSY_ALL_CORE_FT
BMI: BMI (kg/m2): 20.8 (03-16-21 @ 17:28)  HbA1c:   Glucose:   BP: 132/79 (03-25-21 @ 08:02) (125/76 - 132/79)  Lipid Panel:

## 2021-03-26 NOTE — BH INPATIENT PSYCHIATRY PROGRESS NOTE - NSTXPROBVIOLNT_PSY_ALL_CORE
VIOLENT/AGGRESSIVE BEHAVIOR

## 2021-03-26 NOTE — BH INPATIENT PSYCHIATRY PROGRESS NOTE - NSBHCONSULTIPREASON_PSY_A_CORE
danger to others; mental illness expected to respond to inpatient care
other reason
danger to others; mental illness expected to respond to inpatient care
other reason

## 2021-03-26 NOTE — BH INPATIENT PSYCHIATRY PROGRESS NOTE - NSTXPROBDISORG_PSY_ALL_CORE
DISORGANIZATION OF THOUGHT/BEHAVIOR

## 2021-03-26 NOTE — BH INPATIENT PSYCHIATRY PROGRESS NOTE - NSBHMETABOLIC_PSY_ALL_CORE_FT
BMI: BMI (kg/m2): 20.8 (03-16-21 @ 17:28)  HbA1c:   Glucose:   BP: 132/79 (03-25-21 @ 08:02) (125/76 - 132/79)  Lipid Panel: 
BMI: BMI (kg/m2): 20.8 (03-16-21 @ 17:28)  HbA1c:   Glucose:   BP: 125/76 (03-24-21 @ 08:11) (125/76 - 128/78)  Lipid Panel: 
BMI: BMI (kg/m2): 20.8 (03-16-21 @ 17:28)  HbA1c:   Glucose:   BP: 128/78 (03-21-21 @ 12:04) (107/58 - 128/78)  Lipid Panel: 
BMI: BMI (kg/m2): 20.8 (03-16-21 @ 17:28)  HbA1c:   Glucose:   BP: 135/80 (03-18-21 @ 09:49) (107/66 - 135/80)  Lipid Panel: 
BMI: BMI (kg/m2): 20.8 (03-16-21 @ 17:28)  HbA1c:   Glucose:   BP: 107/58 (03-20-21 @ 10:18) (107/58 - 135/80)  Lipid Panel: 
BMI: BMI (kg/m2): 20.8 (03-16-21 @ 17:28)  HbA1c:   Glucose:   BP: 128/78 (03-21-21 @ 12:04) (118/68 - 128/78)  Lipid Panel: 
BMI: BMI (kg/m2): 20.8 (03-16-21 @ 17:28)  HbA1c:   Glucose:   BP: 116/100 (03-19-21 @ 15:18) (107/66 - 135/80)  Lipid Panel: 
BMI: BMI (kg/m2): 20.8 (03-16-21 @ 17:28)  HbA1c:   Glucose:   BP: 128/78 (03-21-21 @ 12:04) (107/58 - 128/78)  Lipid Panel: 
BMI: BMI (kg/m2): 20.8 (03-16-21 @ 17:28)  HbA1c:   Glucose:   BP: 132/79 (03-25-21 @ 08:02) (125/76 - 132/79)  Lipid Panel:

## 2021-03-26 NOTE — BH INPATIENT PSYCHIATRY PROGRESS NOTE - NSICDXBHPRIMARYDX_PSY_ALL_CORE
Autistic spectrum disorder   F84.0  

## 2021-03-26 NOTE — BH INPATIENT PSYCHIATRY DISCHARGE NOTE - NSDCCPCAREPLAN_GEN_ALL_CORE_FT
PRINCIPAL DISCHARGE DIAGNOSIS  Diagnosis: Autistic spectrum disorder  Assessment and Plan of Treatment:

## 2021-03-26 NOTE — BH INPATIENT PSYCHIATRY PROGRESS NOTE - NSTXVIOLNTPROGRES_PSY_ALL_CORE
No Change
Improving
Met - goal discontinued

## 2021-03-26 NOTE — BH INPATIENT PSYCHIATRY PROGRESS NOTE - PRN MEDS
MEDICATIONS  (PRN):  chlorproMAZINE    Injectable 50 milliGRAM(s) IntraMuscular once PRN agitation  chlorproMAZINE    Tablet 50 milliGRAM(s) Oral every 4 hours PRN agitation  LORazepam     Tablet 2 milliGRAM(s) Oral every 4 hours PRN anxiety/agitation  LORazepam   Injectable 2 milliGRAM(s) IntraMuscular once PRN agitation  
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
## 2021-03-26 NOTE — BH INPATIENT PSYCHIATRY PROGRESS NOTE - NSTXIMPULSDATEEST_PSY_ALL_CORE
17-Mar-2021

## 2021-03-26 NOTE — BH INPATIENT PSYCHIATRY PROGRESS NOTE - NSTXDISORGGOAL_PSY_ALL_CORE
Will make at least 3 goal and reality oriented statements during therapy

## 2021-03-26 NOTE — BH INPATIENT PSYCHIATRY PROGRESS NOTE - NSBHLEGALSTATUSCHANGE_PSY_ALL_CORE
Pt called again- she has AA meeting until 4pm would like to talk with someone,.     She feels if no one wants to speak her, I advised  did try and reach out on the 14th at 8pm
No
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
## 2021-03-26 NOTE — BH INPATIENT PSYCHIATRY PROGRESS NOTE - NSTXIMPULSGOAL_PSY_ALL_CORE
Will be able to demonstrate the ability to pause before acting out negatively

## 2021-03-26 NOTE — BH INPATIENT PSYCHIATRY PROGRESS NOTE - CURRENT MEDICATION
MEDICATIONS  (STANDING):  chlorproMAZINE    Tablet 200 milliGRAM(s) Oral at bedtime  diVALproex  milliGRAM(s) Oral two times a day  OLANZapine 5 milliGRAM(s) Oral two times a day    MEDICATIONS  (PRN):  chlorproMAZINE    Injectable 50 milliGRAM(s) IntraMuscular once PRN agitation  chlorproMAZINE    Tablet 50 milliGRAM(s) Oral every 4 hours PRN agitation  LORazepam     Tablet 2 milliGRAM(s) Oral every 4 hours PRN anxiety/agitation  LORazepam   Injectable 2 milliGRAM(s) IntraMuscular once PRN agitation  
MEDICATIONS  (STANDING):  chlorproMAZINE    Tablet 200 milliGRAM(s) Oral at bedtime  diphenhydrAMINE 50 milliGRAM(s) Oral once  diVALproex  milliGRAM(s) Oral two times a day  LORazepam     Tablet 1 milliGRAM(s) Oral once  OLANZapine 5 milliGRAM(s) Oral three times a day    MEDICATIONS  (PRN):  chlorproMAZINE    Injectable 50 milliGRAM(s) IntraMuscular once PRN agitation  chlorproMAZINE    Tablet 50 milliGRAM(s) Oral every 4 hours PRN agitation  LORazepam     Tablet 2 milliGRAM(s) Oral every 4 hours PRN anxiety/agitation  LORazepam   Injectable 2 milliGRAM(s) IntraMuscular once PRN agitation  
MEDICATIONS  (STANDING):  chlorproMAZINE    Tablet 200 milliGRAM(s) Oral at bedtime  diVALproex  milliGRAM(s) Oral two times a day  OLANZapine 5 milliGRAM(s) Oral two times a day    MEDICATIONS  (PRN):  chlorproMAZINE    Injectable 50 milliGRAM(s) IntraMuscular once PRN agitation  chlorproMAZINE    Tablet 50 milliGRAM(s) Oral every 4 hours PRN agitation  LORazepam     Tablet 2 milliGRAM(s) Oral every 4 hours PRN anxiety/agitation  LORazepam   Injectable 2 milliGRAM(s) IntraMuscular once PRN agitation  
MEDICATIONS  (STANDING):  chlorproMAZINE    Tablet 200 milliGRAM(s) Oral at bedtime  diVALproex  milliGRAM(s) Oral two times a day  OLANZapine 5 milliGRAM(s) Oral three times a day    MEDICATIONS  (PRN):  chlorproMAZINE    Injectable 50 milliGRAM(s) IntraMuscular once PRN agitation  chlorproMAZINE    Tablet 50 milliGRAM(s) Oral every 4 hours PRN agitation  LORazepam     Tablet 2 milliGRAM(s) Oral every 4 hours PRN anxiety/agitation  LORazepam   Injectable 2 milliGRAM(s) IntraMuscular once PRN agitation  
MEDICATIONS  (STANDING):  chlorproMAZINE    Tablet 200 milliGRAM(s) Oral at bedtime  diVALproex  milliGRAM(s) Oral two times a day  OLANZapine 5 milliGRAM(s) Oral two times a day    MEDICATIONS  (PRN):  chlorproMAZINE    Injectable 50 milliGRAM(s) IntraMuscular once PRN agitation  chlorproMAZINE    Tablet 50 milliGRAM(s) Oral every 4 hours PRN agitation  LORazepam     Tablet 2 milliGRAM(s) Oral every 4 hours PRN anxiety/agitation  LORazepam   Injectable 2 milliGRAM(s) IntraMuscular once PRN agitation  
MEDICATIONS  (STANDING):  chlorproMAZINE    Tablet 200 milliGRAM(s) Oral at bedtime  diVALproex  milliGRAM(s) Oral two times a day  OLANZapine 5 milliGRAM(s) Oral three times a day    MEDICATIONS  (PRN):  chlorproMAZINE    Injectable 50 milliGRAM(s) IntraMuscular once PRN agitation  chlorproMAZINE    Tablet 50 milliGRAM(s) Oral every 4 hours PRN agitation  LORazepam     Tablet 2 milliGRAM(s) Oral every 4 hours PRN anxiety/agitation  LORazepam   Injectable 2 milliGRAM(s) IntraMuscular once PRN agitation  
MEDICATIONS  (STANDING):  chlorproMAZINE    Tablet 200 milliGRAM(s) Oral at bedtime  diVALproex  milliGRAM(s) Oral two times a day  OLANZapine 5 milliGRAM(s) Oral three times a day    MEDICATIONS  (PRN):  chlorproMAZINE    Injectable 50 milliGRAM(s) IntraMuscular once PRN agitation  chlorproMAZINE    Tablet 50 milliGRAM(s) Oral every 4 hours PRN agitation  LORazepam     Tablet 2 milliGRAM(s) Oral every 4 hours PRN anxiety/agitation  LORazepam   Injectable 2 milliGRAM(s) IntraMuscular once PRN agitation  
MEDICATIONS  (STANDING):  chlorproMAZINE    Tablet 200 milliGRAM(s) Oral at bedtime  diphenhydrAMINE 50 milliGRAM(s) Oral once  diVALproex  milliGRAM(s) Oral two times a day  LORazepam     Tablet 1 milliGRAM(s) Oral once  OLANZapine 5 milliGRAM(s) Oral two times a day    MEDICATIONS  (PRN):  chlorproMAZINE    Injectable 50 milliGRAM(s) IntraMuscular once PRN agitation  chlorproMAZINE    Tablet 50 milliGRAM(s) Oral every 4 hours PRN agitation  LORazepam     Tablet 2 milliGRAM(s) Oral every 4 hours PRN anxiety/agitation  LORazepam   Injectable 2 milliGRAM(s) IntraMuscular once PRN agitation  
MEDICATIONS  (STANDING):  chlorproMAZINE    Tablet 200 milliGRAM(s) Oral at bedtime  diVALproex  milliGRAM(s) Oral two times a day  OLANZapine 5 milliGRAM(s) Oral two times a day    MEDICATIONS  (PRN):  chlorproMAZINE    Injectable 50 milliGRAM(s) IntraMuscular once PRN agitation  chlorproMAZINE    Tablet 50 milliGRAM(s) Oral every 4 hours PRN agitation  LORazepam     Tablet 2 milliGRAM(s) Oral every 4 hours PRN anxiety/agitation  LORazepam   Injectable 2 milliGRAM(s) IntraMuscular once PRN agitation

## 2021-03-26 NOTE — BH INPATIENT PSYCHIATRY DISCHARGE NOTE - HOSPITAL COURSE
Dates of hospitalization:  3/17-3/25     21 yo male with pphx of Autism Spectrum Disorder, OCD, tic disorder. Admitted due to worsening behavior at home leading to agitation and aggression at home. On initial unit assessment, patient communicated he was brought to hospital for being aggressive. Calm and cooperative without urges to harm himself or others. Denied SI/I/P or HI/I/P or VI/I/P. Observed screaming tic.     Patient was continued on Depakote DR (adjusted to 750 mg BID), Zyprexa 5 mg (adjusted to TID), and Thorazine (200 mg qHS). Patient was also given Dupixent injection (3/21) for eczema (monthly).      Prior to admission (a month prior) had developed gait issues possibly in relation to a Luvox injection (1A2 effects may have affected Zyprexa)--discontinued after. Gait without parkinsonian or EPS features on unit.     There were behavioral problems on the unit. Patient at times with agitation (yelling, hitting self, flipping furniture, intrusive of space) requiring Thorazine (50 mg), Ativan (2 mg), Benadryl (50 mg) oral PRNs (Benadryl used in lieu of Thorazine on two occasions). He received IM Ativan, IM Thorazine and required 4-point restraint on initial day, and the IM prns again on second day. As patient acclimated to unit environment, he attained improved behavioral control. It was felt by team that medications had a limited role in the prevention of behavioral disturbances. On one instance (3/24), patient without incident over prior days became acutely agitated due to an adverse phone conversation with his father leading to patient seeking primary team and escalating problem behaviors. The following day however when given discharge date displayed no further incident and kept to himself. Patient was adherent to medications and generally redirectable. Family was contacted at time of admission to obtain collateral, provide psychoeducation, and collaboratively treatment plan.  Safety planning and disposition planning were performed with input from family.      On day of discharge, patient showing maintained behavioral control. Stated he wants to wear his mask, social distance, and take deep breaths. States when angry he will hit his pillow and requests a stress ball as well. Overall, the patient has improved significantly and no longer requires inpatient treatment and care. Patient denies all suicidal and aggressive ideation, intent and plan. Patient displays no psychotic symptoms. Patient is not judged to be an acute danger to self or others at this time. He is stable for transition to outpatient level of care.         Risk Assessment: The patient is at chronic risk of harm to self and others given history of agitation/aggression (in context of Autism Spectrum Disorder behavioral dysregulation). He has had prior psychiatric hospitalizations. Patient with R eye blindness.     Protective factors include history of medication adherence, familial support, in established outpatient mental health and neurologic care. Future oriented. Has CM via OPWDD, in school program.      On admission the patient was felt to be at an acutely elevated risk of harm to self or others given recent elevation in aggressive behavior at home. Over hospitalization, medications were adjusted, and patient was provided with coping mechanisms for stress at home including breathing exercises. Safety planning was conducted. With patient demonstrating improved insight and judgment around aggression.      Although patient has a chronic risk of harm to self or others given the above, it is felt that patient has improved to the capacity attainable by inpatient hospitalization. He is ready for transition to the outpatient level of care.         Patient will be discharged with the following DSM5 diagnoses:   Autism Spectrum Disorder     Patient will be discharged on the following medications:   Depakote DR - 750 mg BID  Zyprexa - 5 mg TID  Thorazine - 200 mg qHS  Thorazine - 50 mg PRN  Ativan - 2 mg PRN

## 2021-03-26 NOTE — BH INPATIENT PSYCHIATRY PROGRESS NOTE - NSTXIMPULSPROGRES_PSY_ALL_CORE
No Change
Improving
No Change
Met - goal discontinued

## 2022-03-01 ENCOUNTER — EMERGENCY (EMERGENCY)
Facility: HOSPITAL | Age: 22
LOS: 0 days | Discharge: HOME | End: 2022-03-02
Attending: FAMILY MEDICINE
Payer: COMMERCIAL

## 2022-03-01 VITALS
DIASTOLIC BLOOD PRESSURE: 89 MMHG | HEART RATE: 100 BPM | OXYGEN SATURATION: 100 % | WEIGHT: 145.06 LBS | SYSTOLIC BLOOD PRESSURE: 141 MMHG | TEMPERATURE: 98 F | HEIGHT: 70 IN | RESPIRATION RATE: 17 BRPM

## 2022-03-01 DIAGNOSIS — R45.6 VIOLENT BEHAVIOR: ICD-10-CM

## 2022-03-01 DIAGNOSIS — F84.0 AUTISTIC DISORDER: ICD-10-CM

## 2022-03-01 DIAGNOSIS — Z20.822 CONTACT WITH AND (SUSPECTED) EXPOSURE TO COVID-19: ICD-10-CM

## 2022-03-01 DIAGNOSIS — Z91.012 ALLERGY TO EGGS: ICD-10-CM

## 2022-03-01 DIAGNOSIS — Z91.010 ALLERGY TO PEANUTS: ICD-10-CM

## 2022-03-01 PROCEDURE — 36415 COLL VENOUS BLD VENIPUNCTURE: CPT

## 2022-03-01 PROCEDURE — 93005 ELECTROCARDIOGRAM TRACING: CPT

## 2022-03-01 PROCEDURE — U0003: CPT

## 2022-03-01 PROCEDURE — 99284 EMERGENCY DEPT VISIT MOD MDM: CPT | Mod: 25

## 2022-03-01 PROCEDURE — 81003 URINALYSIS AUTO W/O SCOPE: CPT

## 2022-03-01 PROCEDURE — 99283 EMERGENCY DEPT VISIT LOW MDM: CPT

## 2022-03-01 PROCEDURE — U0005: CPT

## 2022-03-01 PROCEDURE — 84443 ASSAY THYROID STIM HORMONE: CPT

## 2022-03-01 PROCEDURE — 80053 COMPREHEN METABOLIC PANEL: CPT

## 2022-03-01 PROCEDURE — 85025 COMPLETE CBC W/AUTO DIFF WBC: CPT

## 2022-03-01 PROCEDURE — 80307 DRUG TEST PRSMV CHEM ANLYZR: CPT
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
## 2022-03-02 VITALS
OXYGEN SATURATION: 100 % | HEART RATE: 81 BPM | SYSTOLIC BLOOD PRESSURE: 112 MMHG | TEMPERATURE: 98 F | DIASTOLIC BLOOD PRESSURE: 78 MMHG | RESPIRATION RATE: 17 BRPM

## 2022-03-02 DIAGNOSIS — F63.9 IMPULSE DISORDER, UNSPECIFIED: ICD-10-CM

## 2022-03-02 DIAGNOSIS — F84.0 AUTISTIC DISORDER: ICD-10-CM

## 2022-03-02 LAB
ALBUMIN SERPL ELPH-MCNC: 3.1 G/DL — LOW (ref 3.3–5)
ALP SERPL-CCNC: 133 U/L — HIGH (ref 40–120)
ALT FLD-CCNC: 24 U/L — SIGNIFICANT CHANGE UP (ref 12–78)
ANION GAP SERPL CALC-SCNC: 4 MMOL/L — LOW (ref 5–17)
APAP SERPL-MCNC: < 2 UG/ML (ref 10–30)
APPEARANCE UR: CLEAR — SIGNIFICANT CHANGE UP
AST SERPL-CCNC: 26 U/L — SIGNIFICANT CHANGE UP (ref 15–37)
BASOPHILS # BLD AUTO: 0.02 K/UL — SIGNIFICANT CHANGE UP (ref 0–0.2)
BASOPHILS NFR BLD AUTO: 0.2 % — SIGNIFICANT CHANGE UP (ref 0–2)
BILIRUB SERPL-MCNC: 0.3 MG/DL — SIGNIFICANT CHANGE UP (ref 0.2–1.2)
BILIRUB UR-MCNC: NEGATIVE — SIGNIFICANT CHANGE UP
BUN SERPL-MCNC: 16 MG/DL — SIGNIFICANT CHANGE UP (ref 7–23)
CALCIUM SERPL-MCNC: 8.7 MG/DL — SIGNIFICANT CHANGE UP (ref 8.5–10.1)
CHLORIDE SERPL-SCNC: 111 MMOL/L — HIGH (ref 96–108)
CO2 SERPL-SCNC: 26 MMOL/L — SIGNIFICANT CHANGE UP (ref 22–31)
COLOR SPEC: YELLOW — SIGNIFICANT CHANGE UP
CREAT SERPL-MCNC: 0.69 MG/DL — SIGNIFICANT CHANGE UP (ref 0.5–1.3)
DIFF PNL FLD: NEGATIVE — SIGNIFICANT CHANGE UP
EGFR: 135 ML/MIN/1.73M2 — SIGNIFICANT CHANGE UP
EOSINOPHIL # BLD AUTO: 0.35 K/UL — SIGNIFICANT CHANGE UP (ref 0–0.5)
EOSINOPHIL NFR BLD AUTO: 3.2 % — SIGNIFICANT CHANGE UP (ref 0–6)
ETHANOL SERPL-MCNC: <10 MG/DL — SIGNIFICANT CHANGE UP (ref 0–10)
GLUCOSE SERPL-MCNC: 139 MG/DL — HIGH (ref 70–99)
GLUCOSE UR QL: NEGATIVE — SIGNIFICANT CHANGE UP
HCT VFR BLD CALC: 42.2 % — SIGNIFICANT CHANGE UP (ref 39–50)
HGB BLD-MCNC: 14.1 G/DL — SIGNIFICANT CHANGE UP (ref 13–17)
IMM GRANULOCYTES NFR BLD AUTO: 0.3 % — SIGNIFICANT CHANGE UP (ref 0–1.5)
KETONES UR-MCNC: NEGATIVE — SIGNIFICANT CHANGE UP
LEUKOCYTE ESTERASE UR-ACNC: NEGATIVE — SIGNIFICANT CHANGE UP
LYMPHOCYTES # BLD AUTO: 39 % — SIGNIFICANT CHANGE UP (ref 13–44)
LYMPHOCYTES # BLD AUTO: 4.3 K/UL — HIGH (ref 1–3.3)
MCHC RBC-ENTMCNC: 30.8 PG — SIGNIFICANT CHANGE UP (ref 27–34)
MCHC RBC-ENTMCNC: 33.4 GM/DL — SIGNIFICANT CHANGE UP (ref 32–36)
MCV RBC AUTO: 92.1 FL — SIGNIFICANT CHANGE UP (ref 80–100)
MONOCYTES # BLD AUTO: 1.23 K/UL — HIGH (ref 0–0.9)
MONOCYTES NFR BLD AUTO: 11.2 % — SIGNIFICANT CHANGE UP (ref 2–14)
NEUTROPHILS # BLD AUTO: 5.1 K/UL — SIGNIFICANT CHANGE UP (ref 1.8–7.4)
NEUTROPHILS NFR BLD AUTO: 46.1 % — SIGNIFICANT CHANGE UP (ref 43–77)
NITRITE UR-MCNC: NEGATIVE — SIGNIFICANT CHANGE UP
PH UR: 7 — SIGNIFICANT CHANGE UP (ref 5–8)
PLATELET # BLD AUTO: 213 K/UL — SIGNIFICANT CHANGE UP (ref 150–400)
POTASSIUM SERPL-MCNC: 4.2 MMOL/L — SIGNIFICANT CHANGE UP (ref 3.5–5.3)
POTASSIUM SERPL-SCNC: 4.2 MMOL/L — SIGNIFICANT CHANGE UP (ref 3.5–5.3)
PROT SERPL-MCNC: 7.5 GM/DL — SIGNIFICANT CHANGE UP (ref 6–8.3)
PROT UR-MCNC: NEGATIVE — SIGNIFICANT CHANGE UP
RBC # BLD: 4.58 M/UL — SIGNIFICANT CHANGE UP (ref 4.2–5.8)
RBC # FLD: 12.7 % — SIGNIFICANT CHANGE UP (ref 10.3–14.5)
SALICYLATES SERPL-MCNC: <1.7 MG/DL (ref 2.8–20)
SODIUM SERPL-SCNC: 141 MMOL/L — SIGNIFICANT CHANGE UP (ref 135–145)
SP GR SPEC: 1.01 — SIGNIFICANT CHANGE UP (ref 1.01–1.02)
TSH SERPL-MCNC: 3.94 UU/ML — SIGNIFICANT CHANGE UP (ref 0.34–4.82)
UROBILINOGEN FLD QL: 1
WBC # BLD: 11.03 K/UL — HIGH (ref 3.8–10.5)
WBC # FLD AUTO: 11.03 K/UL — HIGH (ref 3.8–10.5)

## 2022-03-02 PROCEDURE — 93010 ELECTROCARDIOGRAM REPORT: CPT

## 2022-03-02 RX ORDER — DIVALPROEX SODIUM 500 MG/1
750 TABLET, DELAYED RELEASE ORAL ONCE
Refills: 0 | Status: COMPLETED | OUTPATIENT
Start: 2022-03-02 | End: 2022-03-02

## 2022-03-02 NOTE — ED PROVIDER NOTE - CARE PLAN
Principal Discharge DX:	Aggressive behavior, adult   1 Principal Discharge DX:	Aggressive behavior, adult  Secondary Diagnosis:	Autism

## 2022-03-02 NOTE — ED BEHAVIORAL HEALTH ASSESSMENT NOTE - CURRENTLY ENROLLED STUDENT
Gen: Ill appearing, AAOx0, intermittently following commands slowly, cachexia  Head: NCAT  HEENT: PERRL, dry mucus membranes, normal conjunctiva, anicteric, neck supple  Lung: CTAB, no adventitious sounds  CV: RRR, no murmurs  Abd: soft, NTND, no rebound or guarding, no CVAT  MSK: No edema, no visible deformities  Neuro: Moving all extremity grossly  Skin: 2x2cm stage 2 ulcer to R great toe w/ surrounding erythema and warmth, 2mm stage 2 chornic ulcer to base of L 5th toe Gen: Ill appearing, AAOx0, intermittently following commands slowly, cachexia, easily arousable  Head: NCAT  HEENT: PERRL, dry mucus membranes, normal conjunctiva, anicteric, neck supple  Lung: CTAB, no adventitious sounds  CV: RRR, no murmurs  Abd: soft, NTND, no rebound or guarding, no CVAT  MSK: No edema, no visible deformities  Neuro: Moving all extremity grossly  Skin: 2x2cm stage 2 ulcer to R great toe w/ surrounding erythema and warmth, 2mm stage 2 chornic ulcer to base of L 5th toe Gen: Ill appearing, AAOx0, intermittently following commands slowly, cachexia, easily rousable  Head: NCAT  HEENT: PERRL, dry mucus membranes, normal conjunctiva, anicteric, neck supple  Lung: CTAB, no adventitious sounds  CV: RRR, no murmurs  Abd: soft, NTND, no rebound or guarding, no CVAT  MSK: No edema, no visible deformities  Neuro: Moving all extremity grossly, lower extremities in padded boots  Skin: 2x2cm stage 2 ulcer to R great toe w/ surrounding erythema and warmth, 2mm stage 2 chronic ulcer to medial MTP and base of L 5th toe Yes
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
## 2022-03-02 NOTE — ED BEHAVIORAL HEALTH ASSESSMENT NOTE - RISK ASSESSMENT
Unable to determine Suicide Risk No known prior major risk factors but patient unable to engage meaningfully in safety assessment so unable to definitively determine suicide risk at this time.

## 2022-03-02 NOTE — ED BEHAVIORAL HEALTH ASSESSMENT NOTE - CURRENT MEDICATION
As of 3/2021 [facility] D/C, patient was taking Thorazine 200 mg HS and 50 mg daily PRN agitation, Depakote 750 mg BID, Ativan 2 mg daily and Zyprexa 5 mg TID  According to psyckes; however, he was last on Guanfacine 1 mg 3/day, Topamax 25 mg 1/day & 50 mg 2/day, Ambien 10 mg 1/day, Haldol 0.5 mg 1/day, Zyprexa 7.5 mg 1/day, Depakote 500 mg 2/day and Thorazine 50 mg 1/day.

## 2022-03-02 NOTE — ED ADULT NURSE REASSESSMENT NOTE - NS ED NURSE REASSESS COMMENT FT1
Patient awake able to ambulate and provide urine sample. Patient states he is now ready to speak with telepsych. Pt A&Ox3. Vitals WNL.

## 2022-03-02 NOTE — ED ADULT NURSE REASSESSMENT NOTE - NS ED NURSE REASSESS COMMENT FT1
Patient sleeping and woken up for vital signs. Pt cooperative for vitals but unable to urinate at this time. Vital signs stable. 1:1 in place. Respirations even and unlabored. Skin warm and dry. Will continue to monitor.

## 2022-03-02 NOTE — ED BEHAVIORAL HEALTH ASSESSMENT NOTE - NICOTINE
Post-Anesthesia Evaluation and Assessment    Patient: Nette Randhawa MRN: [id number]  SSN: [id number]    YOB: 1942  Age: 76 y.o. Sex: female       Cardiovascular Function/Vital Signs  Visit Vitals    /66    Pulse 79    Temp 36.9 °C (98.4 °F)    Resp 17    Ht 5' 8\" (1.727 m)    Wt 77.1 kg (170 lb)    SpO2 100%    BMI 25.85 kg/m2       Patient is status post total IV anesthesia anesthesia for Procedure(s):  COLONOSCOPY. Nausea/Vomiting: None    Postoperative hydration reviewed and adequate. Pain:  Pain Scale 1: Numeric (0 - 10) (03/15/18 1004)  Pain Intensity 1: 5 (03/15/18 1004)   Managed    Neurological Status: At baseline    Mental Status and Level of Consciousness: Arousable    Pulmonary Status:   O2 Device: Room air (03/15/18 1014)   Adequate oxygenation and airway patent    Complications related to anesthesia: None    Post-anesthesia assessment completed.  No concerns    Signed By: Salima Damon DO     March 15, 2018 None known

## 2022-03-02 NOTE — ED PROVIDER NOTE - NSFOLLOWUPINSTRUCTIONS_ED_ALL_ED_FT
Return to the ER for thoughts of harming yourself, thoughts of harming others, aggressive behavior or other concerns.
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
## 2022-03-02 NOTE — ED BEHAVIORAL HEALTH ASSESSMENT NOTE - DESCRIPTION
as per HPI ED course and collateral attempts are as per BTCM (ED Behavioral health) note     Patient did not give permission to obtain collateral. Rationale for overriding objection is noted as such:    Lack of capacity - Patient unable to engage meaningfully in process of consenting to collateral and additional information critical to caring for patient remains lacking without collateral input.    Assessing risk of danger to self/others - Evaluation being conducted specifically due to expressed or evidenced psyhiatric symptoms that elevate safety risks and collateral source required to accurately determine severity of safety risk and ensure safe disposition plan.  Specific collateral source noted above was selected based on their potential to provide meaningful information that would impact risk assessment of danger to self/others and no alternative equivalent source was available.

## 2022-03-02 NOTE — ED PROVIDER NOTE - PROGRESS NOTE DETAILS
Called numbers listed for contacts. Left messages.  Brenna MONTIEL Spoke to Dr. Roberts telepsych who states we need to speak to family to get collateral. She is willing to eval patient but needs collateral. Brenna MONTIEL

## 2022-03-02 NOTE — ED BEHAVIORAL HEALTH ASSESSMENT NOTE - NSBHROSSYSTEMS_PSY_ALL_CORE
Glycopyrrolate Pregnancy And Lactation Text: This medication is Pregnancy Category B and is considered safe during pregnancy. It is unknown if it is excreted breast milk. Unable to assess
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
## 2022-03-02 NOTE — ED BEHAVIORAL HEALTH ASSESSMENT NOTE - PSYCHIATRIC ISSUES AND PLAN (INCLUDE STANDING AND PRN MEDICATION)
Thorazine 50 mg PO PRN anxiety or agitation; For severe agitation w/ safety threat where pt is unable to accept PO, would favor use of either Thorazine 50 mg IM or Zyprexa 5 mg IM; dose can be repeated after 15 minutes if severe agitation persists.

## 2022-03-02 NOTE — ED BEHAVIORAL HEALTH ASSESSMENT NOTE - OTHER
Unable to assess Records Checked: [facility], [facility], [facility], [facility], [facility] Inpatient, [facility], [facility] Outpatient Medical, [facility] Outpatient BH, [facility] ED, [facility] Inpatient, [facility] Outpatient, [facility] Inpatient, Tier E&A, [facility] Inpatient, [facility] ED, Quick Docs, Health[facility], Psyckes, [facility] Inpatient, [facility], [facility], Social Media (For example - Facebook, Link[employer], [employer]), Web search, Forensic Databases (For example parents Special Ed program

## 2022-03-02 NOTE — ED ADULT NURSE REASSESSMENT NOTE - NS ED NURSE REASSESS COMMENT FT1
Pt awake and calm. Pt being monitored on a 1:1. Safety maintained. Will monitor. Awaiting a psych evaluation.

## 2022-03-02 NOTE — ED PROVIDER NOTE - PATIENT PORTAL LINK FT
You can access the FollowMyHealth Patient Portal offered by [facility] by registering at the following website: [email or web address]. By joining [employer]’s FollowMyHealth portal, you will also be able to view your health information using other applications (apps) compatible with our system.
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
## 2022-03-02 NOTE — ED ADULT NURSE REASSESSMENT NOTE - NS ED NURSE REASSESS COMMENT FT1
patients belongings and valuables returned to patient.  provided with d/c paperwork.  father is here to  patient.
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
## 2022-03-10 ENCOUNTER — EMERGENCY (EMERGENCY)
Facility: HOSPITAL | Age: 22
LOS: 0 days | Discharge: HOME | End: 2022-03-11
Attending: EMERGENCY MEDICINE
Payer: COMMERCIAL

## 2022-03-10 VITALS
DIASTOLIC BLOOD PRESSURE: 83 MMHG | HEART RATE: 102 BPM | SYSTOLIC BLOOD PRESSURE: 123 MMHG | HEIGHT: 70 IN | OXYGEN SATURATION: 100 % | RESPIRATION RATE: 18 BRPM | TEMPERATURE: 98 F

## 2022-03-10 DIAGNOSIS — R45.1 RESTLESSNESS AND AGITATION: ICD-10-CM

## 2022-03-10 DIAGNOSIS — Z20.822 CONTACT WITH AND (SUSPECTED) EXPOSURE TO COVID-19: ICD-10-CM

## 2022-03-10 DIAGNOSIS — Z91.010 ALLERGY TO PEANUTS: ICD-10-CM

## 2022-03-10 DIAGNOSIS — Z91.012 ALLERGY TO EGGS: ICD-10-CM

## 2022-03-10 LAB
ALBUMIN SERPL ELPH-MCNC: 3.2 G/DL — LOW (ref 3.3–5)
ALP SERPL-CCNC: 148 U/L — HIGH (ref 40–120)
ALT FLD-CCNC: 27 U/L — SIGNIFICANT CHANGE UP (ref 12–78)
ANION GAP SERPL CALC-SCNC: 5 MMOL/L — SIGNIFICANT CHANGE UP (ref 5–17)
APAP SERPL-MCNC: <2 UG/ML — LOW (ref 10–30)
AST SERPL-CCNC: 25 U/L — SIGNIFICANT CHANGE UP (ref 15–37)
BASOPHILS # BLD AUTO: 0.02 K/UL — SIGNIFICANT CHANGE UP (ref 0–0.2)
BASOPHILS NFR BLD AUTO: 0.2 % — SIGNIFICANT CHANGE UP (ref 0–2)
BILIRUB SERPL-MCNC: 0.3 MG/DL — SIGNIFICANT CHANGE UP (ref 0.2–1.2)
BUN SERPL-MCNC: 17 MG/DL — SIGNIFICANT CHANGE UP (ref 7–23)
CALCIUM SERPL-MCNC: 8.9 MG/DL — SIGNIFICANT CHANGE UP (ref 8.5–10.1)
CHLORIDE SERPL-SCNC: 109 MMOL/L — HIGH (ref 96–108)
CO2 SERPL-SCNC: 25 MMOL/L — SIGNIFICANT CHANGE UP (ref 22–31)
CREAT SERPL-MCNC: 0.8 MG/DL — SIGNIFICANT CHANGE UP (ref 0.5–1.3)
EGFR: 129 ML/MIN/1.73M2 — SIGNIFICANT CHANGE UP
EOSINOPHIL # BLD AUTO: 0.28 K/UL — SIGNIFICANT CHANGE UP (ref 0–0.5)
EOSINOPHIL NFR BLD AUTO: 3 % — SIGNIFICANT CHANGE UP (ref 0–6)
ETHANOL SERPL-MCNC: <10 MG/DL — SIGNIFICANT CHANGE UP (ref 0–10)
GLUCOSE SERPL-MCNC: 93 MG/DL — SIGNIFICANT CHANGE UP (ref 70–99)
HCT VFR BLD CALC: 40.3 % — SIGNIFICANT CHANGE UP (ref 39–50)
HGB BLD-MCNC: 13.6 G/DL — SIGNIFICANT CHANGE UP (ref 13–17)
IMM GRANULOCYTES NFR BLD AUTO: 0.2 % — SIGNIFICANT CHANGE UP (ref 0–1.5)
LYMPHOCYTES # BLD AUTO: 3.26 K/UL — SIGNIFICANT CHANGE UP (ref 1–3.3)
LYMPHOCYTES # BLD AUTO: 35.2 % — SIGNIFICANT CHANGE UP (ref 13–44)
MCHC RBC-ENTMCNC: 31.1 PG — SIGNIFICANT CHANGE UP (ref 27–34)
MCHC RBC-ENTMCNC: 33.7 GM/DL — SIGNIFICANT CHANGE UP (ref 32–36)
MCV RBC AUTO: 92.2 FL — SIGNIFICANT CHANGE UP (ref 80–100)
MONOCYTES # BLD AUTO: 0.85 K/UL — SIGNIFICANT CHANGE UP (ref 0–0.9)
MONOCYTES NFR BLD AUTO: 9.2 % — SIGNIFICANT CHANGE UP (ref 2–14)
NEUTROPHILS # BLD AUTO: 4.82 K/UL — SIGNIFICANT CHANGE UP (ref 1.8–7.4)
NEUTROPHILS NFR BLD AUTO: 52.2 % — SIGNIFICANT CHANGE UP (ref 43–77)
PLATELET # BLD AUTO: 199 K/UL — SIGNIFICANT CHANGE UP (ref 150–400)
POTASSIUM SERPL-MCNC: 3.9 MMOL/L — SIGNIFICANT CHANGE UP (ref 3.5–5.3)
POTASSIUM SERPL-SCNC: 3.9 MMOL/L — SIGNIFICANT CHANGE UP (ref 3.5–5.3)
PROT SERPL-MCNC: 7.5 GM/DL — SIGNIFICANT CHANGE UP (ref 6–8.3)
RBC # BLD: 4.37 M/UL — SIGNIFICANT CHANGE UP (ref 4.2–5.8)
RBC # FLD: 12.8 % — SIGNIFICANT CHANGE UP (ref 10.3–14.5)
SALICYLATES SERPL-MCNC: <1.7 MG/DL — LOW (ref 2.8–20)
SODIUM SERPL-SCNC: 139 MMOL/L — SIGNIFICANT CHANGE UP (ref 135–145)
WBC # BLD: 9.25 K/UL — SIGNIFICANT CHANGE UP (ref 3.8–10.5)
WBC # FLD AUTO: 9.25 K/UL — SIGNIFICANT CHANGE UP (ref 3.8–10.5)

## 2022-03-10 PROCEDURE — 99213 OFFICE O/P EST LOW 20 MIN: CPT

## 2022-03-10 PROCEDURE — 99284 EMERGENCY DEPT VISIT MOD MDM: CPT

## 2022-03-10 PROCEDURE — 85025 COMPLETE CBC W/AUTO DIFF WBC: CPT

## 2022-03-10 PROCEDURE — 99285 EMERGENCY DEPT VISIT HI MDM: CPT

## 2022-03-10 PROCEDURE — 80053 COMPREHEN METABOLIC PANEL: CPT

## 2022-03-10 PROCEDURE — 80307 DRUG TEST PRSMV CHEM ANLYZR: CPT

## 2022-03-10 PROCEDURE — 87635 SARS-COV-2 COVID-19 AMP PRB: CPT

## 2022-03-10 PROCEDURE — 93005 ELECTROCARDIOGRAM TRACING: CPT

## 2022-03-10 PROCEDURE — 86769 SARS-COV-2 COVID-19 ANTIBODY: CPT

## 2022-03-10 PROCEDURE — 80164 ASSAY DIPROPYLACETIC ACD TOT: CPT

## 2022-03-10 PROCEDURE — 36415 COLL VENOUS BLD VENIPUNCTURE: CPT

## 2022-03-10 PROCEDURE — 96372 THER/PROPH/DIAG INJ SC/IM: CPT

## 2022-03-10 RX ORDER — DIPHENHYDRAMINE HCL 50 MG
50 CAPSULE ORAL ONCE
Refills: 0 | Status: COMPLETED | OUTPATIENT
Start: 2022-03-10 | End: 2022-03-10

## 2022-03-10 RX ORDER — HALOPERIDOL DECANOATE 100 MG/ML
5 INJECTION INTRAMUSCULAR ONCE
Refills: 0 | Status: DISCONTINUED | OUTPATIENT
Start: 2022-03-10 | End: 2022-03-10

## 2022-03-10 RX ORDER — HALOPERIDOL DECANOATE 100 MG/ML
5 INJECTION INTRAMUSCULAR ONCE
Refills: 0 | Status: COMPLETED | OUTPATIENT
Start: 2022-03-10 | End: 2022-03-10

## 2022-03-10 RX ADMIN — HALOPERIDOL DECANOATE 5 MILLIGRAM(S): 100 INJECTION INTRAMUSCULAR at 19:17

## 2022-03-10 RX ADMIN — Medication 2 MILLIGRAM(S): at 19:17

## 2022-03-10 NOTE — ED BEHAVIORAL HEALTH ASSESSMENT NOTE - DETAILS
father expressed comfort in social work intervention and even APS involvement in determining alternate disposition location as he is not willing to accept patient home at this time. as per HPI Pt not engaging discussed with ED provider; case and details will be signed out to in person psychiatrist at 8am
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
## 2022-03-10 NOTE — ED PROVIDER NOTE - CLINICAL SUMMARY MEDICAL DECISION MAKING FREE TEXT BOX
pt w/ autism w/ increasingly aggressive behavior, aggressive w/ ED staff requiring sedation, w/ pt father, increasingly difficult to manage, will get psych labs and consult tele psych.

## 2022-03-10 NOTE — ED BEHAVIORAL HEALTH ASSESSMENT NOTE - CURRENT MEDICATION
As of 3/2021 [facility] D/C, patient was taking Thorazine 200 mg HS and 50 mg daily PRN agitation, Depakote 750 mg BID, Ativan 2 mg daily and Zyprexa 5 mg TID  According to psyckes; however, he was last on Guanfacine 1 mg 3/day, Topamax 25 mg 1/day & 50 mg 2/day, Ambien 10 mg 1/day, Haldol 0.5 mg 1/day, Zyprexa 7.5 mg 1/day, Depakote 500 mg 2/day and Thorazine 50 mg 1/day.

## 2022-03-10 NOTE — ED PROVIDER NOTE - PHYSICAL EXAMINATION
GEN - agitated   HEAD - NC/AT     EYES - EOMI, no conjunctival pallor, no scleral icterus  ENT -   mucous membranes  moist , no discharge      NECK - Neck supple  PULM - CTA b/l,  symmetric breath sounds  COR -  RRR, S1 S2, no murmurs  ABD - , ND, NT, soft, no guarding, no rebound, no masses    BACK - no CVA tenderness, nontender spine     EXTREMS - no edema, no deformity, warm and well perfused    SKIN - no rash or bruising      PSYCH- agitated/aggressive, screaming, tearful.
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
## 2022-03-10 NOTE — ED PROVIDER NOTE - NSFOLLOWUPINSTRUCTIONS_ED_ALL_ED_FT
1. return for worsening symptoms or anything concerning to you  2. take all home meds as prescribed  3. follow up with your pmd call to make an appointment  4. follow up with psychiatry and see their recommendations

## 2022-03-10 NOTE — ED PROVIDER NOTE - PATIENT PORTAL LINK FT
You can access the FollowMyHealth Patient Portal offered by [facility] by registering at the following website: [email or web address]. By joining [employer]’s FollowMyHealth portal, you will also be able to view your health information using other applications (apps) compatible with our system.

## 2022-03-10 NOTE — ED BEHAVIORAL HEALTH ASSESSMENT NOTE - OTHER
Unable to assess Records Checked: Marina ED, Marina Inpatient, Marina CL, Alpha ED, Alpha Inpatient, Alpha CL, HIE Outpatient Medical, HIE Outpatient BH, HIE ED, CVM Inpatient, CVM Outpatient, Tier Inpatient, Tier E&A, Meditech Inpatient, Meditech ED, Quick Docs, Healthix, Psyckes, One Content Inpatient, One Content CL, [facility], Social Media (For example - Facebook, [employer], REPP), Web search, Forensic Databases (For example parents Special Ed program
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
## 2022-03-10 NOTE — ED BEHAVIORAL HEALTH ASSESSMENT NOTE - REASON
pt not engaging and social factors involved requiring further exploration; patient may ultimately require a medical or psychiatric admission for social reasons as mentioned above.

## 2022-03-10 NOTE — ED BEHAVIORAL HEALTH ASSESSMENT NOTE - DESCRIPTION
as per HPI ED course and additional details from collateral are as per BTCM (ED Behavioral health) note     Patient did not give permission to obtain collateral. Rationale for overriding objection is noted as such:    Lack of capacity - Patient unable to engage meaningfully in process of consenting to collateral and additional information critical to caring for patient remains lacking without collateral input.    Assessing risk of danger to self/others - Evaluation being conducted specifically due to expressed or evidenced psyhiatric symptoms that elevate safety risks and collateral source required to accurately determine severity of safety risk and ensure safe disposition plan.  Specific collateral source noted above was selected based on their potential to provide meaningful information that would impact risk assessment of danger to self/others and no alternative equivalent source was available.

## 2022-03-10 NOTE — ED PROVIDER NOTE - PROGRESS NOTE DETAILS
pt signed out to me pending psych and sw. cleared by psych. SW originally stated that father would not  patient however now father is here and is willing to take patient home. they have outpatient psych follow up. will patricia. Teja Hurd M.D., Attending Physician
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
## 2022-03-11 VITALS
SYSTOLIC BLOOD PRESSURE: 122 MMHG | TEMPERATURE: 99 F | RESPIRATION RATE: 16 BRPM | OXYGEN SATURATION: 99 % | DIASTOLIC BLOOD PRESSURE: 69 MMHG | HEART RATE: 98 BPM

## 2022-03-11 LAB
COVID-19 SPIKE DOMAIN AB INTERP: POSITIVE
COVID-19 SPIKE DOMAIN ANTIBODY RESULT: >250 U/ML — HIGH
PCP SPEC-MCNC: SIGNIFICANT CHANGE UP
SARS-COV-2 IGG+IGM SERPL QL IA: >250 U/ML — HIGH
SARS-COV-2 IGG+IGM SERPL QL IA: POSITIVE
SARS-COV-2 RNA SPEC QL NAA+PROBE: SIGNIFICANT CHANGE UP
VALPROATE SERPL-MCNC: 31 UG/ML — LOW (ref 50–100)

## 2022-03-11 PROCEDURE — 90792 PSYCH DIAG EVAL W/MED SRVCS: CPT | Mod: 95

## 2022-03-11 PROCEDURE — 93010 ELECTROCARDIOGRAM REPORT: CPT

## 2022-03-11 RX ORDER — TOPIRAMATE 25 MG
75 TABLET ORAL AT BEDTIME
Refills: 0 | Status: DISCONTINUED | OUTPATIENT
Start: 2022-03-11 | End: 2022-03-11

## 2022-03-11 RX ORDER — OLANZAPINE 15 MG/1
5 TABLET, FILM COATED ORAL EVERY 6 HOURS
Refills: 0 | Status: DISCONTINUED | OUTPATIENT
Start: 2022-03-11 | End: 2022-03-11

## 2022-03-11 RX ORDER — OLANZAPINE 15 MG/1
1 TABLET, FILM COATED ORAL
Qty: 0 | Refills: 0 | DISCHARGE

## 2022-03-11 RX ORDER — OLANZAPINE 15 MG/1
7.5 TABLET, FILM COATED ORAL
Refills: 0 | Status: DISCONTINUED | OUTPATIENT
Start: 2022-03-11 | End: 2022-03-11

## 2022-03-11 RX ORDER — OLANZAPINE 15 MG/1
5 TABLET, FILM COATED ORAL ONCE
Refills: 0 | Status: COMPLETED | OUTPATIENT
Start: 2022-03-11 | End: 2022-03-11

## 2022-03-11 RX ORDER — DIVALPROEX SODIUM 500 MG/1
500 TABLET, DELAYED RELEASE ORAL ONCE
Refills: 0 | Status: COMPLETED | OUTPATIENT
Start: 2022-03-11 | End: 2022-03-11

## 2022-03-11 RX ORDER — GUANFACINE 3 MG/1
1.5 TABLET, EXTENDED RELEASE ORAL DAILY
Refills: 0 | Status: DISCONTINUED | OUTPATIENT
Start: 2022-03-11 | End: 2022-03-11

## 2022-03-11 RX ORDER — DIVALPROEX SODIUM 500 MG/1
750 TABLET, DELAYED RELEASE ORAL
Refills: 0 | Status: DISCONTINUED | OUTPATIENT
Start: 2022-03-11 | End: 2022-03-11

## 2022-03-11 RX ORDER — CLOMIPRAMINE HYDROCHLORIDE 50 MG/1
50 CAPSULE ORAL AT BEDTIME
Refills: 0 | Status: DISCONTINUED | OUTPATIENT
Start: 2022-03-11 | End: 2022-03-11

## 2022-03-11 RX ORDER — HALOPERIDOL DECANOATE 100 MG/ML
0.5 INJECTION INTRAMUSCULAR DAILY
Refills: 0 | Status: DISCONTINUED | OUTPATIENT
Start: 2022-03-11 | End: 2022-03-11

## 2022-03-11 RX ORDER — LEVOTHYROXINE SODIUM 125 MCG
25 TABLET ORAL DAILY
Refills: 0 | Status: DISCONTINUED | OUTPATIENT
Start: 2022-03-11 | End: 2022-03-11

## 2022-03-11 RX ORDER — TOPIRAMATE 25 MG
50 TABLET ORAL DAILY
Refills: 0 | Status: DISCONTINUED | OUTPATIENT
Start: 2022-03-11 | End: 2022-03-11

## 2022-03-11 RX ADMIN — OLANZAPINE 5 MILLIGRAM(S): 15 TABLET, FILM COATED ORAL at 11:19

## 2022-03-11 RX ADMIN — Medication 50 MILLIGRAM(S): at 11:19

## 2022-03-11 RX ADMIN — GUANFACINE 1.5 MILLIGRAM(S): 3 TABLET, EXTENDED RELEASE ORAL at 11:20

## 2022-03-11 RX ADMIN — Medication 25 MICROGRAM(S): at 11:19

## 2022-03-11 RX ADMIN — HALOPERIDOL DECANOATE 0.5 MILLIGRAM(S): 100 INJECTION INTRAMUSCULAR at 11:26

## 2022-03-11 RX ADMIN — DIVALPROEX SODIUM 500 MILLIGRAM(S): 500 TABLET, DELAYED RELEASE ORAL at 11:28

## 2022-03-11 NOTE — PROGRESS NOTE BEHAVIORAL HEALTH - NSBHCHARTREVIEWINVESTIGATE_PSY_A_CORE FT
Ventricular Rate 110 BPM    Atrial Rate 110 BPM    P-R Interval 112 ms    QRS Duration 92 ms    Q-T Interval 342 ms    QTC Calculation(Bazett) 462 ms    P Axis 59 degrees    R Axis 61 degrees    T Axis 34 degrees    Diagnosis Line *** Poor data quality, interpretation may be adversely affected  Sinus tachycardia  Otherwise normal ECG  When compared with ECG of 02-MAR-2022 00:04,  No significant change was found

## 2022-03-11 NOTE — ED ADULT NURSE REASSESSMENT NOTE - NS ED NURSE REASSESS COMMENT FT1
Assumed care of pt from RN Anuradha nicolas. pt appears anxious and agitated. As per 1:1 at bedside, pt has started hitting himself. Pt requesting to go home and to see dad. Contacted MD jovel who updated RN on plan of care. Father not responding to MD calls. Pending placement by SW. Notified MD jovel of pt's home meds, med list at bedside. will ctm and medicate upon receipt of orders.
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
## 2022-03-11 NOTE — PROGRESS NOTE BEHAVIORAL HEALTH - NSBHCHARTREVIEWVS_PSY_A_CORE FT
Vital Signs Last 24 Hrs  T(C): 36.8 (11 Mar 2022 06:04), Max: 36.8 (10 Mar 2022 18:22)  T(F): 98.3 (11 Mar 2022 06:04), Max: 98.3 (11 Mar 2022 06:04)  HR: 73 (11 Mar 2022 06:04) (61 - 102)  BP: 138/76 (11 Mar 2022 06:04) (123/83 - 138/76)  BP(mean): --  RR: 18 (11 Mar 2022 06:04) (18 - 18)  SpO2: 97% (11 Mar 2022 06:04) (95% - 100%)

## 2022-03-11 NOTE — PROGRESS NOTE BEHAVIORAL HEALTH - RISK ASSESSMENT
risks are assessed in summary above. PT si at low acute risk to suicide, however he si on chr long term risk for agitation and in that sense for aggression and self-harm.   Protective factors are that he is willing  to takes his  meds and follows directions . and mitigation is meds and out-patient  treatement.

## 2022-03-11 NOTE — CHART NOTE - NSCHARTNOTEFT_GEN_A_CORE
03/11/2022 Patient is a 21 yr. old single male with Autism brought to ED for behavioral behavior at home. As per Behavioral Health pt. is cleared from their stand point and not in need of psychiatric admission. A call made to patient's father who is fearful for his life and not willing to  his son. A call made to APS and patient on their log for an intake on Monday, 03/14/2022. A call made to OPWDD care coordinator Cecy Li @ [phone] who will look into emergency respite care , but is not sure if it is available. S/W will follow. Gisel Fatima LCSW

## 2022-03-11 NOTE — PROGRESS NOTE BEHAVIORAL HEALTH - NSBHCHARTREVIEWLAB_PSY_A_CORE FT
13.6   9.25  )-----------( 199      ( 10 Mar 2022 22:41 )             40.3   03-10    139  |  109<H>  |  17  ----------------------------<  93  3.9   |  25  |  0.80    Ca    8.9      10 Mar 2022 22:41    TPro  7.5  /  Alb  3.2<L>  /  TBili  0.3  /  DBili  x   /  AST  25  /  ALT  27  /  AlkPhos  148<H>  03-10

## 2022-03-11 NOTE — ED ADULT NURSE REASSESSMENT NOTE - NS ED NURSE REASSESS COMMENT FT1
Pt slept somewhat well through the night. Only need pt made known was desire to see father and to go home soon.  No other complaints offered.  VSS. Pt to be reassessed in ED this morning according to behavior assessment note plan.

## 2022-03-11 NOTE — PROGRESS NOTE BEHAVIORAL HEALTH - NSBHCONSULTMEDS_PSY_A_CORE FT
MEDICATIONS  (STANDING):  clomiPRAMINE . 50 milliGRAM(s) Oral at bedtime  diVALproex  milliGRAM(s) Oral two times a day  guanFACINE. 1.5 milliGRAM(s) Oral daily  haloperidol     Tablet 0.5 milliGRAM(s) Oral daily  levothyroxine 25 MICROGram(s) Oral daily  OLANZapine  Oral Tab/Cap - Peds 7.5 milliGRAM(s) Oral two times a day  topiramate 50 milliGRAM(s) Oral daily  topiramate 75 milliGRAM(s) Oral at bedtime
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
## 2022-03-23 NOTE — BH INPATIENT PSYCHIATRY PROGRESS NOTE - CASE SUMMARY
Better behavioral control over weekend, no IMs required.  Unclear frequency of vocal tics, at most maybe 1-2x an hour though most of day, appears to be less than this. [FreeTextEntry1] : BMBx 3/1/2022\par Final Diagnosis :\par 1, 2. Bone marrow biopsy and bone marrow aspirate\par      - Plasma cell myeloma, 25-35% of cellularity by immunohistochemistry\par      - Trilineage hematopoiesis with maturation, mild eosinophilia, serous\par  atrophy, and iron stores present\par \par Diagnostic Note:\par As per chart review, the patient was referred for elevated free light\par  chains. Her light chain ratio was found to be very low at 0.11 and was\par  found to have hypogammaglobulinemia with no monoclonal protein on SPEP,\par  hemoglobin normal, normal renal function, and no hypercalcemia. Fat\par  pad biopsy showed positive congo red staining in rare blood vessels\par  and positive for polarization. Bone marrow to evaluate for plasma cell\par  myeloma. The bone marrow shows a significant plasma cell infiltrate by\par  immunohistochemistry (25-35% of cellularity), also positive with cyclin\par  D1, with monotypic plasma cells by flow cytometry. Congo red stain is\par  negative.\par Correlation with studies for myeloma-related organ dysfunction is\par  necessary for definitive classification (smoldering multiple myeloma\par \par  versus plasma cell myeloma).\par \par Please note findings of a   normal female karyotype and positive MULTIPLE\par  MYELOMA FISH panel, atypical CCND1/IGH fusion pattern detected (3.5%)    .\par  Based on the additional findings, the diagnosis has not changed.\par  No aggression or agitation overnight.  Pt perseverative on leaving hospital on March 29th.  Continue meds as ordered.  Tics intermittent but no continuous period noted by staff.

## 2022-05-13 NOTE — ED PROVIDER NOTE - MDM PATIENT STATEMENT FOR ADDL TREATMENT
PAST MEDICAL HISTORY:  No pertinent past medical history Patient with one or more new problems requiring additional work-up/treatment.

## 2022-05-24 ENCOUNTER — INPATIENT (INPATIENT)
Facility: HOSPITAL | Age: 22
LOS: 2 days | Discharge: HOME | DRG: 884 | End: 2022-05-27
Attending: PSYCHIATRY & NEUROLOGY | Admitting: PSYCHIATRY & NEUROLOGY
Payer: COMMERCIAL

## 2022-05-24 VITALS
HEIGHT: 70 IN | OXYGEN SATURATION: 100 % | RESPIRATION RATE: 18 BRPM | SYSTOLIC BLOOD PRESSURE: 133 MMHG | HEART RATE: 83 BPM | DIASTOLIC BLOOD PRESSURE: 91 MMHG | TEMPERATURE: 98 F

## 2022-05-24 LAB
ADD ON TEST-SPECIMEN IN LAB: SIGNIFICANT CHANGE UP
ADD ON TEST-SPECIMEN IN LAB: SIGNIFICANT CHANGE UP
ALBUMIN SERPL ELPH-MCNC: 3.7 G/DL — SIGNIFICANT CHANGE UP (ref 3.3–5)
ALP SERPL-CCNC: 157 U/L — HIGH (ref 40–120)
ALT FLD-CCNC: 33 U/L — SIGNIFICANT CHANGE UP (ref 12–78)
ANION GAP SERPL CALC-SCNC: 4 MMOL/L — LOW (ref 5–17)
APAP SERPL-MCNC: < 2 UG/ML (ref 10–30)
AST SERPL-CCNC: 28 U/L — SIGNIFICANT CHANGE UP (ref 15–37)
BASOPHILS # BLD AUTO: 0.02 K/UL — SIGNIFICANT CHANGE UP (ref 0–0.2)
BASOPHILS NFR BLD AUTO: 0.2 % — SIGNIFICANT CHANGE UP (ref 0–2)
BILIRUB SERPL-MCNC: 0.3 MG/DL — SIGNIFICANT CHANGE UP (ref 0.2–1.2)
BUN SERPL-MCNC: 13 MG/DL — SIGNIFICANT CHANGE UP (ref 7–23)
CALCIUM SERPL-MCNC: 9.2 MG/DL — SIGNIFICANT CHANGE UP (ref 8.5–10.1)
CHLORIDE SERPL-SCNC: 111 MMOL/L — HIGH (ref 96–108)
CO2 SERPL-SCNC: 25 MMOL/L — SIGNIFICANT CHANGE UP (ref 22–31)
CREAT SERPL-MCNC: 0.76 MG/DL — SIGNIFICANT CHANGE UP (ref 0.5–1.3)
EGFR: 131 ML/MIN/1.73M2 — SIGNIFICANT CHANGE UP
EOSINOPHIL # BLD AUTO: 0.18 K/UL — SIGNIFICANT CHANGE UP (ref 0–0.5)
EOSINOPHIL NFR BLD AUTO: 1.9 % — SIGNIFICANT CHANGE UP (ref 0–6)
ETHANOL SERPL-MCNC: <10 MG/DL — SIGNIFICANT CHANGE UP (ref 0–10)
GLUCOSE SERPL-MCNC: 93 MG/DL — SIGNIFICANT CHANGE UP (ref 70–99)
HCT VFR BLD CALC: 42.6 % — SIGNIFICANT CHANGE UP (ref 39–50)
HGB BLD-MCNC: 14.6 G/DL — SIGNIFICANT CHANGE UP (ref 13–17)
IMM GRANULOCYTES NFR BLD AUTO: 0.1 % — SIGNIFICANT CHANGE UP (ref 0–1.5)
LYMPHOCYTES # BLD AUTO: 2.68 K/UL — SIGNIFICANT CHANGE UP (ref 1–3.3)
LYMPHOCYTES # BLD AUTO: 28.7 % — SIGNIFICANT CHANGE UP (ref 13–44)
MCHC RBC-ENTMCNC: 31.1 PG — SIGNIFICANT CHANGE UP (ref 27–34)
MCHC RBC-ENTMCNC: 34.3 GM/DL — SIGNIFICANT CHANGE UP (ref 32–36)
MCV RBC AUTO: 90.8 FL — SIGNIFICANT CHANGE UP (ref 80–100)
MONOCYTES # BLD AUTO: 0.96 K/UL — HIGH (ref 0–0.9)
MONOCYTES NFR BLD AUTO: 10.3 % — SIGNIFICANT CHANGE UP (ref 2–14)
NEUTROPHILS # BLD AUTO: 5.49 K/UL — SIGNIFICANT CHANGE UP (ref 1.8–7.4)
NEUTROPHILS NFR BLD AUTO: 58.8 % — SIGNIFICANT CHANGE UP (ref 43–77)
PCP SPEC-MCNC: SIGNIFICANT CHANGE UP
PLATELET # BLD AUTO: 205 K/UL — SIGNIFICANT CHANGE UP (ref 150–400)
POTASSIUM SERPL-MCNC: 3.9 MMOL/L — SIGNIFICANT CHANGE UP (ref 3.5–5.3)
POTASSIUM SERPL-SCNC: 3.9 MMOL/L — SIGNIFICANT CHANGE UP (ref 3.5–5.3)
PROT SERPL-MCNC: 8.3 GM/DL — SIGNIFICANT CHANGE UP (ref 6–8.3)
RBC # BLD: 4.69 M/UL — SIGNIFICANT CHANGE UP (ref 4.2–5.8)
RBC # FLD: 12.9 % — SIGNIFICANT CHANGE UP (ref 10.3–14.5)
SALICYLATES SERPL-MCNC: <1.7 MG/DL — LOW (ref 2.8–20)
SODIUM SERPL-SCNC: 140 MMOL/L — SIGNIFICANT CHANGE UP (ref 135–145)
WBC # BLD: 9.34 K/UL — SIGNIFICANT CHANGE UP (ref 3.8–10.5)
WBC # FLD AUTO: 9.34 K/UL — SIGNIFICANT CHANGE UP (ref 3.8–10.5)

## 2022-05-24 PROCEDURE — 99285 EMERGENCY DEPT VISIT HI MDM: CPT

## 2022-05-24 RX ORDER — HALOPERIDOL DECANOATE 100 MG/ML
5 INJECTION INTRAMUSCULAR ONCE
Refills: 0 | Status: DISCONTINUED | OUTPATIENT
Start: 2022-05-24 | End: 2022-05-27

## 2022-05-24 RX ORDER — HALOPERIDOL DECANOATE 100 MG/ML
5 INJECTION INTRAMUSCULAR ONCE
Refills: 0 | Status: COMPLETED | OUTPATIENT
Start: 2022-05-24 | End: 2022-05-24

## 2022-05-24 RX ADMIN — HALOPERIDOL DECANOATE 5 MILLIGRAM(S): 100 INJECTION INTRAMUSCULAR at 20:50

## 2022-05-24 RX ADMIN — Medication 2 MILLIGRAM(S): at 20:50
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
## 2022-05-24 NOTE — ED PROVIDER NOTE - NSFOLLOWUPINSTRUCTIONS_ED_ALL_ED_FT
Continue usual care and medicines.     Follow up with your doctor.     Return to ER for worsening, any concerns.
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
## 2022-05-24 NOTE — ED ADULT NURSE REASSESSMENT NOTE - NS ED NURSE REASSESS COMMENT FT1
pt very anxious, stomping and repeatedly coming out to desk, approaching people to "go home" . after multiple attempts to contact father, mother contacted, who's number is in chart. mother was not aware pt was in ER, and states she cannot pick him up b/c she gave up custody.
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
## 2022-05-24 NOTE — ED ADULT NURSE REASSESSMENT NOTE - NS ED NURSE REASSESS COMMENT FT1
pt getting anxious, wants to go home, asked to call his dad, attempted multiple times, phone is going directly to voicemail. MD aware

## 2022-05-24 NOTE — ED PROVIDER NOTE - PHYSICAL EXAMINATION
Constitutional: NAD AOx3  Eyes: PERRL EOMI  Head: Normocephalic atraumatic  Mouth: MMM  Cardiac: regular rate and rhythm  Resp: Lungs CTAB  GI: Abd s/nd/nt  Neuro: CN2-12 grossly intact, UMANA x 4  Skin: No visible rashes   Psych: Pt calm and cooperative in ED. Constitutional: NAD AOx3 poor eye contact  Eyes: PERRL EOMI  Head: Normocephalic atraumatic  Mouth: MMM  Cardiac: regular rate and rhythm  Resp: Lungs CTAB  GI: Abd s/nd/nt  Neuro: CN2-12 grossly intact, UMANA x 4  Skin: No visible rashes   Psych: Pt calm and cooperative in ED. No HI/SI
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
## 2022-05-24 NOTE — ED PROVIDER NOTE - NS ED ROS FT
Constitutional: No fever or chills  Eyes: No visual changes  HEENT: No throat pain  CV: No chest pain  Resp: No SOB no cough  GI: No abd pain, nausea or vomiting  : No dysuria  MSK: No musculoskeletal pain  Skin: No rash  Neuro: No headache   Psych: +aggressive behavior +agitation
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
## 2022-05-24 NOTE — ED ADULT NURSE REASSESSMENT NOTE - NS ED NURSE REASSESS COMMENT FT1
spoke with patients father.  [facility] is willing to  patient tonight but is playing soccer for the next 60-90 minutes.  will call father when patient is ready to be picked up.  patient provided with pudding.  patient is calm and cooperative.

## 2022-05-25 DIAGNOSIS — F84.0 AUTISTIC DISORDER: ICD-10-CM

## 2022-05-25 DIAGNOSIS — F63.9 IMPULSE DISORDER, UNSPECIFIED: ICD-10-CM

## 2022-05-25 DIAGNOSIS — R46.89 OTHER SYMPTOMS AND SIGNS INVOLVING APPEARANCE AND BEHAVIOR: ICD-10-CM

## 2022-05-25 LAB
COVID-19 SPIKE DOMAIN AB INTERP: POSITIVE
COVID-19 SPIKE DOMAIN ANTIBODY RESULT: >250 U/ML — HIGH
SARS-COV-2 IGG+IGM SERPL QL IA: >250 U/ML — HIGH
SARS-COV-2 IGG+IGM SERPL QL IA: POSITIVE
SARS-COV-2 RNA SPEC QL NAA+PROBE: SIGNIFICANT CHANGE UP
TSH SERPL-MCNC: 4.8 UU/ML — SIGNIFICANT CHANGE UP (ref 0.34–4.82)

## 2022-05-25 PROCEDURE — 36415 COLL VENOUS BLD VENIPUNCTURE: CPT

## 2022-05-25 PROCEDURE — 99222 1ST HOSP IP/OBS MODERATE 55: CPT

## 2022-05-25 PROCEDURE — 93005 ELECTROCARDIOGRAM TRACING: CPT

## 2022-05-25 PROCEDURE — 99221 1ST HOSP IP/OBS SF/LOW 40: CPT

## 2022-05-25 PROCEDURE — 93010 ELECTROCARDIOGRAM REPORT: CPT

## 2022-05-25 PROCEDURE — 83036 HEMOGLOBIN GLYCOSYLATED A1C: CPT

## 2022-05-25 PROCEDURE — 84443 ASSAY THYROID STIM HORMONE: CPT

## 2022-05-25 PROCEDURE — 86769 SARS-COV-2 COVID-19 ANTIBODY: CPT

## 2022-05-25 RX ORDER — DIVALPROEX SODIUM 500 MG/1
500 TABLET, DELAYED RELEASE ORAL
Refills: 0 | Status: DISCONTINUED | OUTPATIENT
Start: 2022-05-25 | End: 2022-05-27

## 2022-05-25 RX ORDER — CLONAZEPAM 1 MG
0.5 TABLET ORAL THREE TIMES A DAY
Refills: 0 | Status: DISCONTINUED | OUTPATIENT
Start: 2022-05-25 | End: 2022-05-27

## 2022-05-25 RX ORDER — TOPIRAMATE 25 MG
75 TABLET ORAL AT BEDTIME
Refills: 0 | Status: DISCONTINUED | OUTPATIENT
Start: 2022-05-25 | End: 2022-05-27

## 2022-05-25 RX ORDER — OLANZAPINE 15 MG/1
10 TABLET, FILM COATED ORAL
Refills: 0 | Status: DISCONTINUED | OUTPATIENT
Start: 2022-05-25 | End: 2022-05-27

## 2022-05-25 RX ORDER — DIPHENHYDRAMINE HCL 50 MG
50 CAPSULE ORAL ONCE
Refills: 0 | Status: COMPLETED | OUTPATIENT
Start: 2022-05-25 | End: 2022-05-25

## 2022-05-25 RX ORDER — HALOPERIDOL DECANOATE 100 MG/ML
5 INJECTION INTRAMUSCULAR EVERY 6 HOURS
Refills: 0 | Status: DISCONTINUED | OUTPATIENT
Start: 2022-05-25 | End: 2022-05-27

## 2022-05-25 RX ORDER — TOPIRAMATE 25 MG
50 TABLET ORAL DAILY
Refills: 0 | Status: DISCONTINUED | OUTPATIENT
Start: 2022-05-25 | End: 2022-05-27

## 2022-05-25 RX ORDER — LEVOTHYROXINE SODIUM 125 MCG
25 TABLET ORAL DAILY
Refills: 0 | Status: DISCONTINUED | OUTPATIENT
Start: 2022-05-25 | End: 2022-05-27

## 2022-05-25 RX ORDER — HALOPERIDOL DECANOATE 100 MG/ML
5 INJECTION INTRAMUSCULAR ONCE
Refills: 0 | Status: COMPLETED | OUTPATIENT
Start: 2022-05-25 | End: 2022-05-25

## 2022-05-25 RX ORDER — DIPHENHYDRAMINE HCL 50 MG
50 CAPSULE ORAL EVERY 6 HOURS
Refills: 0 | Status: DISCONTINUED | OUTPATIENT
Start: 2022-05-25 | End: 2022-05-27

## 2022-05-25 RX ORDER — AMITRIPTYLINE HCL 25 MG
50 TABLET ORAL AT BEDTIME
Refills: 0 | Status: DISCONTINUED | OUTPATIENT
Start: 2022-05-25 | End: 2022-05-25

## 2022-05-25 RX ADMIN — Medication 50 MILLIGRAM(S): at 12:21

## 2022-05-25 RX ADMIN — Medication 0.5 MILLIGRAM(S): at 21:17

## 2022-05-25 RX ADMIN — Medication 2 MILLIGRAM(S): at 12:21

## 2022-05-25 RX ADMIN — HALOPERIDOL DECANOATE 5 MILLIGRAM(S): 100 INJECTION INTRAMUSCULAR at 12:21

## 2022-05-25 RX ADMIN — Medication 50 MILLIGRAM(S): at 16:52

## 2022-05-25 RX ADMIN — DIVALPROEX SODIUM 500 MILLIGRAM(S): 500 TABLET, DELAYED RELEASE ORAL at 21:17

## 2022-05-25 RX ADMIN — Medication 25 MICROGRAM(S): at 16:52

## 2022-05-25 RX ADMIN — Medication 75 MILLIGRAM(S): at 21:16

## 2022-05-25 RX ADMIN — OLANZAPINE 10 MILLIGRAM(S): 15 TABLET, FILM COATED ORAL at 21:16

## 2022-05-25 NOTE — ED BEHAVIORAL HEALTH NOTE - BEHAVIORAL HEALTH NOTE
===================    PRE-HOSPITAL COURSE    ===================    SOURCE:  Secondhand EMR documentation.     DETAILS:  Patient BIBEMS; chief complaint of aggression.   ============    ED COURSE:    ============    SOURCE:  RN and secondhand EMR documentation.     ARRIVAL:  Patient was cooperative with hospital protocol and allowed for gowning/wanding without incident. Patient presents with good hygiene/grooming. Patient placed on 1:1 In private room for consult.     BELONGINGS:  None notable.    BEHAVIOR: Blood/covid swab provided for routine labs without noted incident. Patient denies SI/HI/AH/VH; requesting to leave, asking for dad, code grey called and medication administered. Patient is AOx3 and makes fair eye contact; speech of normal volume/rate. Patient has been resting while in hospital bed.  TREATMENT: Patient received 5mg Haldol and 2mg Ativan IM for acute agitation.  VISITORS:  Patient presently unaccompanied by social supports while in ED.       COVID Exposure Screen- collateral (i.e. third-party, chart review, belongings, etc; include EMS and ED staff)   1. *Has the patient been tested for COVID-19 in the last 90 days? (  ) Yes ( ) No (X) Unknown- Reason: _____    IF YES PROCEED TO QUESTION #2. IF NO OR UNKNOWN, PLEASE SKIP TO QUESTION #3.    2. Date of test(s), type of test(s), result(s) for ALL tests in last 90 days: ________   3. *Has the patient received a COVID-19 vaccine? (  ) Yes ( ) No (X) Unknown- Reason: _____    IF YES PROCEED TO QUESTION #4. IF NO or UNKNOWN, PLEASE SKIP TO QUESTION #7.    4. Moderna ( ) Pfizer ( ) J&J ( )     5. Number of doses: ____ ____    6. Date of last dose: ________    7. *In the past 10 days, has the patient been around anyone with a positive COVID-19 test?* ( ) Yes (X) No ( ) Unknown- Reason: ____    IF YES PLEASE ANSWER THE FOLLOWING QUESTIONS:    8. Was the patient within 6 feet of them for at least 15 minutes? ( ) Yes ( ) No (X) Unknown- Reason: _____    9. Did the patient provide care for them? ( ) Yes ( ) No ( ) Unknown- Reason: ______    10. Did the patient have direct physical contact with them (touched, hugged, or kissed them)? ( ) Yes ( ) No ( ) Unknown- Reason: __    11. Did the patient share eating or drinking utensils with them? ( ) Yes ( ) No ( ) Unknown- Reason: ____    12. Did they sneeze, cough, or somehow get respiratory droplets on the patient? ( ) Yes ( ) No ( ) Unknown- Reason: ______    Writer attempted to contact father Сергей [phone], phone rings, unable to leave voicemail message.

## 2022-05-25 NOTE — ED BEHAVIORAL HEALTH ASSESSMENT NOTE - MEDICAL ISSUES AND PLAN (INCLUDE STANDING AND PRN MEDICATION)
Complex Repair And Skin Substitute Graft Text: The defect edges were debeveled with a #15 scalpel blade.  The primary defect was closed partially with a complex linear closure.  Given the location of the remaining defect, shape of the defect and the proximity to free margins a skin substitute graft was deemed most appropriate to repair the remaining defect.  The graft was trimmed to fit the size of the remaining defect.  The graft was then placed in the primary defect, oriented appropriately, and sutured into place. none acute

## 2022-05-25 NOTE — CHART NOTE - NSCHARTNOTEFT_GEN_A_CORE
SW contacted [facility] and spoke to Demarcus [phone].  Pt is active with services through [facility] and his CM Jennifer Obregon [phone] email [email or web address] her supervisor is [email or web address].  Pt receives direct services.  stated, pt is eligible for  respite services at [facility]. They are currently full but are expecting some movement by next week.  Pt's family is unable to be reached.  It is my understanding pt father is currently hospitalized.

## 2022-05-25 NOTE — ED BEHAVIORAL HEALTH ASSESSMENT NOTE - DESCRIPTION
as per HPI ED course and additional details from collateral attempts are as per BTCM (ED Behavioral health) note     Patient did not give permission to obtain collateral. Rationale for overriding objection is noted as such:    Lack of capacity - Patient unable to engage meaningfully in process of consenting to collateral and additional information critical to caring for patient remains lacking without collateral input.    Assessing risk of danger to self/others - Evaluation being conducted specifically due to expressed or evidenced psyhiatric symptoms that elevate safety risks and collateral source required to accurately determine severity of safety risk and ensure safe disposition plan.  Specific collateral source noted above was selected based on their potential to provide meaningful information that would impact risk assessment of danger to self/others and no alternative equivalent source was available.

## 2022-05-25 NOTE — ED BEHAVIORAL HEALTH ASSESSMENT NOTE - CURRENT MEDICATION
As of March 2022: Clomipramine 50 mg HS, Depakote 750 mg BID, Guanfacine 1.5 mg daily, Haldol 0.5 mg daily, Zyprexa 7.5 mg BID, Topamax 50 mg daily & 75 mg HS and Levothyroxine 25 mcg daily.

## 2022-05-25 NOTE — PATIENT PROFILE BEHAVIORAL HEALTH - DOMESTIC TRAVEL HIGH RISK QUESTION
HPI Comments: Parts of this chart was created by speech recognition software. Therefore spelling, punctuations, phrases and numbers may be incorrect depending on the accuracy of the software program.  The provider creating this document reserves the right to make corrections in the future. 51-year-old female complaining of rash and itching her. Also swollen lip. Patient relates that she was on Macrobid for UTI and several days ago but had a rash last few days and the body. Got worse she would see her primary care doctor put her on steroid mouth. Did not get any better today she went back to his PCP he gave her a shot of steroids. She still having some rash which is pruritic    Patient is a 62 y.o. female presenting with allergic reaction. The history is provided by the patient. Allergic Reaction    This is a new problem. The current episode started more than 2 days ago. Ingested substance: Merian Jocelin. Past Medical History:   Diagnosis Date    Migraines        Past Surgical History:   Procedure Laterality Date    ABDOMEN SURGERY PROC UNLISTED      hernia    HX HEENT      nose    HX ORTHOPAEDIC      finger         History reviewed. No pertinent family history. Social History     Social History    Marital status:      Spouse name: N/A    Number of children: N/A    Years of education: N/A     Occupational History    Not on file. Social History Main Topics    Smoking status: Never Smoker    Smokeless tobacco: Not on file    Alcohol use No    Drug use: No    Sexual activity: Not on file     Other Topics Concern    Not on file     Social History Narrative         ALLERGIES: Ciprofloxacin; Macrobid [nitrofurantoin monohyd/m-cryst]; and Sulfa (sulfonamide antibiotics)    Review of Systems   Constitutional: Negative. Negative for activity change. HENT: Negative. Eyes: Negative. Respiratory: Negative. Cardiovascular: Negative. Gastrointestinal: Negative. Genitourinary: Negative. Musculoskeletal: Negative. Skin: Negative. Neurological: Negative. Psychiatric/Behavioral: Negative. All other systems reviewed and are negative. Vitals:    04/27/18 0124   BP: 176/90   Pulse: 88   Resp: 20   Temp: 97 °F (36.1 °C)   SpO2: 98%   Weight: 77.1 kg (170 lb)   Height: 5' 7\" (1.702 m)            Physical Exam     MDM  Number of Diagnoses or Management Options  Adverse effect of drug, subsequent encounter:   Diagnosis management comments: Patient has body aches joint aches and neck pain. Discussed lumbar puncture with patient and . .  They did not think that meningitis was a probability. She's had no fever or chills and this seems to be related to the medicine she took and the rash has. No respiratory involvement or stridor. Comfortable rash improved with follow-up PCP for possible referral to immunology or other specialists.         ED Course       Procedures No

## 2022-05-25 NOTE — ED ADULT NURSE REASSESSMENT NOTE - NS ED NURSE REASSESS COMMENT FT1
Pt currently sleeping. Constant observation and safety maintained. Pt to be admitted to psychiatry, waiting for bed.
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
## 2022-05-25 NOTE — CONSULT NOTE ADULT - ASSESSMENT
#Aggression  #Autism  #Impulse control disorder  #OCD  1. management as per Psych      #Hypothyroid  1. continue levothyroxine 25 mcg  2. TSH pending      #VTE  1. document ambulation      We will sign off at this time  Please reconsult as needed

## 2022-05-25 NOTE — ED BEHAVIORAL HEALTH ASSESSMENT NOTE - DETAILS
as above discussed with ED provider; case and details will be signed out to in person psychiatrist at 8am no answer from family

## 2022-05-25 NOTE — ED BEHAVIORAL HEALTH ASSESSMENT NOTE - RISK ASSESSMENT
No known prior major risk factors but patient unable to engage meaningfully in safety assessment so unable to definitively determine suicide risk at this time. Unable to determine Suicide Risk
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
## 2022-05-25 NOTE — PATIENT PROFILE BEHAVIORAL HEALTH - FALL HARM RISK - UNIVERSAL INTERVENTIONS
Bed in lowest position, wheels locked, appropriate side rails in place/Call bell, personal items and telephone in reach/Instruct patient to call for assistance before getting out of bed or chair/Non-slip footwear when patient is out of bed/[place] to call system/Physically safe environment - no spills, clutter or unnecessary equipment/Purposeful Proactive Rounding/Room/bathroom lighting operational, light cord in reach

## 2022-05-25 NOTE — ED BEHAVIORAL HEALTH ASSESSMENT NOTE - OTHER
unable to assess Special Ed program family sedate; unable to cooperate with assessment Ununable to assess Records Checked: [facility], [facility], [facility], [facility], [facility], [facility], [facility], [facility], [facility], [facility], [facility], [facility], [facility] E&A, [facility], [facility], Quick [facility], [facility], [facility], [facility], [facility], [facility], Social Media (For example - Facebook, [employer], [employer]), Web search, Forensic Databases (For example

## 2022-05-26 LAB
A1C WITH ESTIMATED AVERAGE GLUCOSE RESULT: 5.5 % — SIGNIFICANT CHANGE UP (ref 4–5.6)
ESTIMATED AVERAGE GLUCOSE: 111 MG/DL — SIGNIFICANT CHANGE UP (ref 68–114)

## 2022-05-26 PROCEDURE — 99232 SBSQ HOSP IP/OBS MODERATE 35: CPT

## 2022-05-26 RX ADMIN — Medication 0.5 MILLIGRAM(S): at 10:07

## 2022-05-26 RX ADMIN — OLANZAPINE 10 MILLIGRAM(S): 15 TABLET, FILM COATED ORAL at 10:07

## 2022-05-26 RX ADMIN — Medication 0.5 MILLIGRAM(S): at 21:22

## 2022-05-26 RX ADMIN — Medication 50 MILLIGRAM(S): at 10:07

## 2022-05-26 RX ADMIN — Medication 0.5 MILLIGRAM(S): at 14:09

## 2022-05-26 RX ADMIN — DIVALPROEX SODIUM 500 MILLIGRAM(S): 500 TABLET, DELAYED RELEASE ORAL at 21:22

## 2022-05-26 RX ADMIN — OLANZAPINE 10 MILLIGRAM(S): 15 TABLET, FILM COATED ORAL at 21:22

## 2022-05-26 RX ADMIN — DIVALPROEX SODIUM 500 MILLIGRAM(S): 500 TABLET, DELAYED RELEASE ORAL at 10:08

## 2022-05-26 RX ADMIN — Medication 75 MILLIGRAM(S): at 21:22

## 2022-05-26 RX ADMIN — Medication 25 MICROGRAM(S): at 06:37

## 2022-05-26 NOTE — DISCHARGE NOTE BEHAVIORAL HEALTH - CONDITIONS AT DISCHARGE
Patient alert, oriented x3, pleasant and cooperative. Therapist and nurse reviewed safety plan with patient who denies any thoughts to self harm or others. SW and nurse reviewed discharge plan with patient and dad who are in agreement with plan. Patient received a copy of his discharge instructions. All belongings returned to patient.

## 2022-05-26 NOTE — DISCHARGE NOTE BEHAVIORAL HEALTH - MEDICATION SUMMARY - MEDICATIONS TO STOP TAKING
I will STOP taking the medications listed below when I get home from the hospital:    Tenex 1 mg oral tablet  -- 1.5 tab(s) by mouth 2 times a day    Vitamin D3 25 mcg (1000 intl units) oral tablet  -- 1 tab(s) by mouth once a day    haloperidol 0.5 mg oral tablet  -- 1 tab(s) by mouth once a day (in the morning)    Anafranil 50 mg oral capsule  -- 1 cap(s) by mouth once a day (in the morning)

## 2022-05-26 NOTE — DISCHARGE NOTE BEHAVIORAL HEALTH - NSBHDCSWCOMMENTSFT_PSY_A_CORE
Pt. and family were educated on the importance of pt. taking medications as prescribed and to not stop or miss any doses unless directed by prescribing provider. Pt. and family were counseled on the importance of pt. attending outpatient appointments for ongoing development of coping skills in particular. Pt. and family were made aware of crisis resources to use after hours as needed including returning to the hospital.    Pt. was educated regarding safety precautions for COVID-19 including hand hygiene, wearing a mask, and maintaining social distancing and advised to seek medical attention if experiencing any symptoms. Pt. provided written handout of COVID-19 symptoms and management in discharge packet.

## 2022-05-26 NOTE — DISCHARGE NOTE BEHAVIORAL HEALTH - NSBHDCCRISISPLAN2FT_PSY_A_CORE
[facility]N: [phone]  RAMONITA- Tiffany Lal LMSW  Psychiatrist- Dr. Clayton

## 2022-05-26 NOTE — DISCHARGE NOTE BEHAVIORAL HEALTH - MEDICATION SUMMARY - MEDICATIONS TO CHANGE
I will SWITCH the dose or number of times a day I take the medications listed below when I get home from the hospital:    OLANZapine 7.5 mg oral tablet  -- 1 tab(s) by mouth once a day (at bedtime)    OLANZapine 5 mg oral tablet, disintegrating  -- 1 tab(s) by mouth once a day (in the morning)

## 2022-05-26 NOTE — DISCHARGE NOTE BEHAVIORAL HEALTH - NSBHDCRESPONSEFT_PSY_A_CORE
improved  with pt able to maintain behavioral control. Pt with decreased pacing and wandering behavior.

## 2022-05-26 NOTE — DISCHARGE NOTE BEHAVIORAL HEALTH - FAMILY HISTORY OF PSYCHIATRIC ILLNESS
Pt resides with his Father, who is his legal guardian, and step mother. Pt will be graduating from school next month and CM has begun applying for Day Program and Group Home placement. Pt resides with his Father, who is his legal guardian, and step mother. Pt will be graduating from school next month and CM has begun applying for Day Program and Group Home placement. Special Ed program

## 2022-05-26 NOTE — DISCHARGE NOTE BEHAVIORAL HEALTH - MEDICATION SUMMARY - MEDICATIONS TO TAKE
I will START or STAY ON the medications listed below when I get home from the hospital:    clonazePAM 0.5 mg oral tablet  -- 1 tab(s) by mouth 3 times a day MDD:1.5mg  -- Indication: For Anxiety    Topamax 25 mg oral tablet  -- 3 tab(s) by mouth once a day (at bedtime) MDD:75mg  -- Indication: For Impulse control disorder    Topamax 50 mg oral tablet  -- 1 tab(s) by mouth once a day (in the morning) MDD:50mg  -- Indication: For Impulse control disorder    divalproex sodium 500 mg oral delayed release tablet  -- 1 tab(s) by mouth 2 times a day  -- Indication: For Impulse control disorder    OLANZapine 10 mg oral tablet  -- 1 tab(s) by mouth 2 times a day MDD:20mg  -- Indication: For Impulse control disorder    levothyroxine 25 mcg (0.025 mg) oral tablet  -- 1 tab(s) by mouth once a day  -- Indication: For hypothyrois

## 2022-05-26 NOTE — DISCHARGE NOTE BEHAVIORAL HEALTH - NSBHDCVIOLSAFETYFT_PSY_A_CORE
Advised to return to hospital or go to nearest ED or call 911 or (875) LIFENET or [phone] 656 TALK hotlines for any severe, worsening or persistent symptoms including suicidal/homicidal ideations, intent or plans. Patient verbalized understanding of instructions.

## 2022-05-26 NOTE — DISCHARGE NOTE BEHAVIORAL HEALTH - NSBHDCCRISISPLAN1FT_PSY_A_CORE
Tell a trusted friend/family member, tell housing staff, tell clinic staff, call a crisis line ( Crisis Center ph. [phone], [facility] DASH [phone], [facility] (peer support) ph. [phone]), return to the nearest emergency room. You may call 9-1-1 for assistance.

## 2022-05-26 NOTE — DISCHARGE NOTE BEHAVIORAL HEALTH - NSBHDCREFEROTHERFT_PSY_A_CORE
[facility] DASH- for psychiatric crises (available AFTER HOURS)  24/7 hotline: [phone]  Address: [address] [place] [address] [place], [address] Crisis Center: [phone]  *Walk In Crisis Center for psychiatric needs. Open 9am-5pm, M-F.  1-888-NYC-WELL (0-522-240-6313)  National Suicide Prevention Lifeline 1-611.110.6104

## 2022-05-26 NOTE — DISCHARGE NOTE BEHAVIORAL HEALTH - NSBHDCVIOLFCTRSFT_PSY_A_CORE
1. mood and affect lability -pt on MEDICATIONS  (STANDING):  clonazePAM  Tablet 0.5 milliGRAM(s) Oral three times a day  diVALproex  milliGRAM(s) Oral two times a day  levothyroxine 25 MICROGram(s) Oral daily  OLANZapine 10 milliGRAM(s) Oral two times a day  topiramate 50 milliGRAM(s) Oral daily  topiramate 75 milliGRAM(s) Oral at bedtime

## 2022-05-26 NOTE — DISCHARGE NOTE BEHAVIORAL HEALTH - NSBHDCADDFT_PSY_A_CORE
HgA1c A1C with Estimated Average Glucose Result: 5.5: Method: Immunoassay       Reference Range                4.0-5.6%       High risk (prediabetic)        5.7-6.4%       Diabetic, diagnostic             >=6.5%       ADA diabetic treatment goal       <7.0%  The Hemoglobin A1c testing is NGSP-certified.Reference ranges are based  upon the 2010 recommendations of  the American Diabetes Association.  Interpretation may vary for children  and adolescents. % (05.25.22 @ 21:44)
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
## 2022-05-26 NOTE — DISCHARGE NOTE BEHAVIORAL HEALTH - NS MD DC FALL RISK RISK
For information on Fall & Injury Prevention, visit: [email or web address] OR  [email or web address] OR  https://www.cdc.gov/steadi/patient.html

## 2022-05-26 NOTE — DISCHARGE NOTE BEHAVIORAL HEALTH - NSBHDCTHERAPYFT_PSY_A_CORE
-Psychosocial assessment  -Individual, group and milieu therapies  -Psychoeducation related to pt's diagnosis and treatment  -Safety planning  -Family/support system engagement   -Discharge planning

## 2022-05-26 NOTE — DISCHARGE NOTE BEHAVIORAL HEALTH - NSBHDCMEDSFT_PSY_A_CORE
MEDICATIONS  (STANDING):  clonazePAM  Tablet 0.5 milliGRAM(s) Oral three times a day  diVALproex  milliGRAM(s) Oral two times a day  levothyroxine 25 MICROGram(s) Oral daily  OLANZapine 10 milliGRAM(s) Oral two times a day  topiramate 50 milliGRAM(s) Oral daily  topiramate 75 milliGRAM(s) Oral at bedtime

## 2022-05-27 VITALS — TEMPERATURE: 98 F | OXYGEN SATURATION: 100 % | RESPIRATION RATE: 18 BRPM

## 2022-05-27 PROCEDURE — 93010 ELECTROCARDIOGRAM REPORT: CPT

## 2022-05-27 PROCEDURE — 99239 HOSP IP/OBS DSCHRG MGMT >30: CPT

## 2022-05-27 RX ORDER — DIVALPROEX SODIUM 500 MG/1
1 TABLET, DELAYED RELEASE ORAL
Qty: 30 | Refills: 1
Start: 2022-05-27 | End: 2022-06-25

## 2022-05-27 RX ORDER — LEVOTHYROXINE SODIUM 125 MCG
1 TABLET ORAL
Qty: 0 | Refills: 0 | DISCHARGE

## 2022-05-27 RX ORDER — OLANZAPINE 15 MG/1
1 TABLET, FILM COATED ORAL
Qty: 0 | Refills: 0 | DISCHARGE

## 2022-05-27 RX ORDER — TOPIRAMATE 25 MG
3 TABLET ORAL
Qty: 45 | Refills: 1
Start: 2022-05-27 | End: 2022-06-25

## 2022-05-27 RX ORDER — OLANZAPINE 15 MG/1
1 TABLET, FILM COATED ORAL
Qty: 30 | Refills: 1
Start: 2022-05-27 | End: 2022-06-25

## 2022-05-27 RX ORDER — CLONAZEPAM 1 MG
1 TABLET ORAL
Qty: 45 | Refills: 0
Start: 2022-05-27 | End: 2022-06-10

## 2022-05-27 RX ORDER — CLOMIPRAMINE HYDROCHLORIDE 50 MG/1
1 CAPSULE ORAL
Qty: 0 | Refills: 0 | DISCHARGE

## 2022-05-27 RX ORDER — DIVALPROEX SODIUM 500 MG/1
1 TABLET, DELAYED RELEASE ORAL
Qty: 0 | Refills: 0 | DISCHARGE

## 2022-05-27 RX ORDER — GUANFACINE 3 MG/1
1.5 TABLET, EXTENDED RELEASE ORAL
Qty: 0 | Refills: 0 | DISCHARGE

## 2022-05-27 RX ORDER — LEVOTHYROXINE SODIUM 125 MCG
1 TABLET ORAL
Qty: 15 | Refills: 1
Start: 2022-05-27 | End: 2022-06-25

## 2022-05-27 RX ORDER — HALOPERIDOL DECANOATE 100 MG/ML
1 INJECTION INTRAMUSCULAR
Qty: 0 | Refills: 0 | DISCHARGE

## 2022-05-27 RX ORDER — TOPIRAMATE 25 MG
1 TABLET ORAL
Qty: 0 | Refills: 0 | DISCHARGE

## 2022-05-27 RX ORDER — TOPIRAMATE 25 MG
3 TABLET ORAL
Qty: 0 | Refills: 0 | DISCHARGE

## 2022-05-27 RX ORDER — TOPIRAMATE 25 MG
1 TABLET ORAL
Qty: 15 | Refills: 1
Start: 2022-05-27 | End: 2022-06-25

## 2022-05-27 RX ORDER — CHOLECALCIFEROL (VITAMIN D3) 125 MCG
1 CAPSULE ORAL
Qty: 0 | Refills: 0 | DISCHARGE

## 2022-05-27 RX ADMIN — Medication 50 MILLIGRAM(S): at 09:15

## 2022-05-27 RX ADMIN — Medication 0.5 MILLIGRAM(S): at 09:15

## 2022-05-27 RX ADMIN — OLANZAPINE 10 MILLIGRAM(S): 15 TABLET, FILM COATED ORAL at 09:15

## 2022-05-27 RX ADMIN — Medication 0.5 MILLIGRAM(S): at 12:44

## 2022-05-27 RX ADMIN — Medication 25 MICROGRAM(S): at 06:58

## 2022-05-27 RX ADMIN — DIVALPROEX SODIUM 500 MILLIGRAM(S): 500 TABLET, DELAYED RELEASE ORAL at 09:19

## 2022-05-27 NOTE — PROGRESS NOTE BEHAVIORAL HEALTH - NSBHADMITIPREASON_PSY_A_CORE
Danger to self; mental illness expected to respond to inpatient care
Danger to self; mental illness expected to respond to inpatient care
Danger to others; mental illness expected to respond to inpatient care
Danger to self; mental illness expected to respond to inpatient care

## 2022-05-27 NOTE — PROGRESS NOTE BEHAVIORAL HEALTH - NSBHCHARTREVIEWVS_PSY_A_CORE FT
Vital Signs Last 24 Hrs  T(C): 36.8 (27 May 2022 09:39), Max: 36.8 (27 May 2022 09:39)  T(F): 98.2 (27 May 2022 09:39), Max: 98.2 (27 May 2022 09:39)  HR: --  BP: --  BP(mean): --  RR: 18 (27 May 2022 09:39) (18 - 18)  SpO2: 100% (27 May 2022 09:39) (100% - 100%)
Vital Signs Last 24 Hrs  T(C): --  T(F): --  HR: --  BP: --  BP(mean): --  RR: --  SpO2: --
Vital Signs Last 24 Hrs  T(C): 36.6 (25 May 2022 11:29), Max: 36.9 (24 May 2022 17:12)  T(F): 97.9 (25 May 2022 11:29), Max: 98.4 (24 May 2022 17:12)  HR: 110 (25 May 2022 11:29) (74 - 110)  BP: 126/72 (25 May 2022 11:29) (110/74 - 133/91)  BP(mean): 87 (25 May 2022 11:29) (87 - 87)  RR: 16 (25 May 2022 11:29) (16 - 18)  SpO2: 100% (25 May 2022 11:29) (100% - 100%)
Vital Signs Last 24 Hrs  T(C): 36.4 (25 May 2022 15:38), Max: 36.6 (25 May 2022 11:29)  T(F): 97.6 (25 May 2022 15:38), Max: 97.9 (25 May 2022 11:29)  HR: 103 (25 May 2022 14:35) (74 - 110)  BP: 135/85 (25 May 2022 14:35) (110/74 - 135/85)  BP(mean): 87 (25 May 2022 11:29) (87 - 87)  RR: 19 (25 May 2022 15:38) (16 - 19)  SpO2: 100% (25 May 2022 15:38) (100% - 100%)
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
## 2022-05-27 NOTE — PROGRESS NOTE BEHAVIORAL HEALTH - NSBHADMITIPOBSFT_PSY_A_CORE
pt denies any suicidal ideation intent or plan
pt denies any suicidal ideation intent or plan
low acute suicide risk
pt denies any suicidal ideation intent or plan

## 2022-05-27 NOTE — PROGRESS NOTE BEHAVIORAL HEALTH - NSBHCHARTREVIEWLAB_PSY_A_CORE FT
14.6   9.34  )-----------( 205      ( 24 May 2022 21:54 )             42.6   05-24    140  |  111<H>  |  13  ----------------------------<  93  3.9   |  25  |  0.76    Ca    9.2      24 May 2022 21:54    TPro  8.3  /  Alb  3.7  /  TBili  0.3  /  DBili  x   /  AST  28  /  ALT  33  /  AlkPhos  157<H>  05-24
14.6   9.34  )-----------( 205      ( 24 May 2022 21:54 )             42.6     CBC Full  -  ( 24 May 2022 21:54 )  WBC Count : 9.34 K/uL  RBC Count : 4.69 M/uL  Hemoglobin : 14.6 g/dL  Hematocrit : 42.6 %  Platelet Count - Automated : 205 K/uL  Mean Cell Volume : 90.8 fl  Mean Cell Hemoglobin : 31.1 pg  Mean Cell Hemoglobin Concentration : 34.3 gm/dL  Auto Neutrophil # : 5.49 K/uL  Auto Lymphocyte # : 2.68 K/uL  Auto Monocyte # : 0.96 K/uL  Auto Eosinophil # : 0.18 K/uL  Auto Basophil # : 0.02 K/uL  Auto Neutrophil % : 58.8 %  Auto Lymphocyte % : 28.7 %  Auto Monocyte % : 10.3 %  Auto Eosinophil % : 1.9 %  Auto Basophil % : 0.2 %

## 2022-05-27 NOTE — PROGRESS NOTE BEHAVIORAL HEALTH - RISK ASSESSMENT
acute: pt with impulsivity, preoccupied, concrete   mitigating : willing to take medication, denies any intent   protective support of family, stable place to live   chronic .  Prior psych admission , lack of insight
acute: pt with impulsivity, preoccupied, concrete   mitigating : willing to take medication, denies any intent   protective support of family, stable place to live   chronic .  Prior psych admission , lack of insight
Increased risk for egression to others :autistic, impulsive, can not reason,
acute: pt with impulsivity, preoccupied, concrete   mitigating : willing to take medication, denies any intent   protective support of family, stable place to live   chronic .  Prior psych admission , lack of insight

## 2022-05-28 NOTE — CHART NOTE - NSCHARTNOTEFT_GEN_A_CORE
Arrived home, has medications and is not suicidal. Number left by patient is not in service or accepting calls.  Called father and left voice message.  Awaiting return call.

## 2022-05-29 NOTE — CHART NOTE - NSCHARTNOTEFT_GEN_A_CORE
Writer attempted outreach to patient to follow up after discharge. Unable to make direct contact. Voicemail left requesting call back.

## 2022-06-01 DIAGNOSIS — F63.9 IMPULSE DISORDER, UNSPECIFIED: ICD-10-CM

## 2022-06-01 DIAGNOSIS — F90.9 ATTENTION-DEFICIT HYPERACTIVITY DISORDER, UNSPECIFIED TYPE: ICD-10-CM

## 2022-06-01 DIAGNOSIS — F84.0 AUTISTIC DISORDER: ICD-10-CM

## 2022-06-01 DIAGNOSIS — F42.9 OBSESSIVE-COMPULSIVE DISORDER, UNSPECIFIED: ICD-10-CM

## 2022-06-01 DIAGNOSIS — E03.9 HYPOTHYROIDISM, UNSPECIFIED: ICD-10-CM

## 2023-03-20 ENCOUNTER — OFFICE (OUTPATIENT)
Dept: URBAN - METROPOLITAN AREA CLINIC 12 | Facility: CLINIC | Age: 23
Setting detail: OPHTHALMOLOGY
End: 2023-03-20
Payer: COMMERCIAL

## 2023-03-20 DIAGNOSIS — H40.012: ICD-10-CM

## 2023-03-20 DIAGNOSIS — H44.521: ICD-10-CM

## 2023-03-20 DIAGNOSIS — H00.11: ICD-10-CM

## 2023-03-20 PROCEDURE — 92004 COMPRE OPH EXAM NEW PT 1/>: CPT | Performed by: STUDENT IN AN ORGANIZED HEALTH CARE EDUCATION/TRAINING PROGRAM

## 2023-03-20 ASSESSMENT — CONFRONTATIONAL VISUAL FIELD TEST (CVF)
OS_FINDINGS: FULL
OD_FINDINGS: FULL

## 2023-03-21 PROBLEM — H44.521: Status: ACTIVE | Noted: 2023-03-20

## 2023-03-21 ASSESSMENT — REFRACTION_CURRENTRX
OS_OVR_VA: 20/
OD_CYLINDER: SPHERE
OD_VPRISM_DIRECTION: SV
OS_SPHERE: -3.25
OD_SPHERE: PLANO
OD_OVR_VA: 20/
OS_CYLINDER: -4.00
OD_AXIS: 0
OS_VPRISM_DIRECTION: SV
OS_AXIS: 165

## 2023-03-21 ASSESSMENT — VISUAL ACUITY
OS_BCVA: 20/HM
OD_BCVA: 20/40-2

## 2023-08-23 NOTE — ED BEHAVIORAL HEALTH ASSESSMENT NOTE - PATIENT'S CHIEF COMPLAINT
Patient UA resulted back with [facility] UTI.. Called patient SNF to pass along results. Discussed with Darvin and results faxed ([phone])  for continuity of care and so facility can start abx.    Christy Silva  Trauma PA-C  Phone      Unable to assess

## 2023-12-27 NOTE — ED ADULT NURSE REASSESSMENT NOTE - NS ED NURSE REASSESS COMMENT FT1
----- Message from Allie Quiroz sent at 12/27/2023  3:05 PM CST -----  Regarding: appointment  Name of Who is Calling: Bonifacio         What is the request in detail: Patient is requesting a call back to schedule his 6 month follow up.           Can the clinic reply by [facility]: Yes           What Number to Call Back if not in [facility]: [phone]      patient sleeping in stretcher. 1:1 maintained for safety. patient safety maintained.

## 2024-12-20 ENCOUNTER — RX ONLY (RX ONLY)
Age: 24
End: 2024-12-20

## 2024-12-20 ENCOUNTER — OFFICE (OUTPATIENT)
Dept: URBAN - METROPOLITAN AREA CLINIC 12 | Facility: CLINIC | Age: 24
Setting detail: OPHTHALMOLOGY
End: 2024-12-20
Payer: COMMERCIAL

## 2024-12-20 DIAGNOSIS — H40.012: ICD-10-CM

## 2024-12-20 DIAGNOSIS — H44.521: ICD-10-CM

## 2024-12-20 PROCEDURE — 92014 COMPRE OPH EXAM EST PT 1/>: CPT | Performed by: STUDENT IN AN ORGANIZED HEALTH CARE EDUCATION/TRAINING PROGRAM

## 2024-12-20 ASSESSMENT — REFRACTION_CURRENTRX
OS_AXIS: 165
OS_OVR_VA: 20/
OS_CYLINDER: -4.00
OS_SPHERE: -3.25
OD_SPHERE: PLANO
OD_VPRISM_DIRECTION: SV
OS_VPRISM_DIRECTION: SV
OD_CYLINDER: SPHERE
OD_AXIS: 0
OD_OVR_VA: 20/

## 2024-12-20 ASSESSMENT — CONFRONTATIONAL VISUAL FIELD TEST (CVF)
OD_FINDINGS: FULL
OS_FINDINGS: FULL

## 2024-12-20 ASSESSMENT — REFRACTION_AUTOREFRACTION
OS_SPHERE: -3.25
OS_CYLINDER: -4.25
OS_AXIS: 175
OD_SPHERE: UNABLE

## 2024-12-20 ASSESSMENT — VISUAL ACUITY
OD_BCVA: 20/40-2
OS_BCVA: 20/HM

## 2024-12-20 ASSESSMENT — KERATOMETRY
OS_K1POWER_DIOPTERS: 41.25
OS_K2POWER_DIOPTERS: 45.00
OS_AXISANGLE_DEGREES: 088
OD_K1POWER_DIOPTERS: UNABLE

## 2024-12-20 ASSESSMENT — TONOMETRY: OS_IOP_MMHG: 17

## 2025-06-23 ENCOUNTER — OFFICE (OUTPATIENT)
Dept: URBAN - METROPOLITAN AREA CLINIC 12 | Facility: CLINIC | Age: 25
Setting detail: OPHTHALMOLOGY
End: 2025-06-23
Payer: COMMERCIAL

## 2025-06-23 DIAGNOSIS — H40.012: ICD-10-CM

## 2025-06-23 PROCEDURE — 99213 OFFICE O/P EST LOW 20 MIN: CPT | Performed by: STUDENT IN AN ORGANIZED HEALTH CARE EDUCATION/TRAINING PROGRAM

## 2025-06-23 ASSESSMENT — CONFRONTATIONAL VISUAL FIELD TEST (CVF)
OD_FINDINGS: FULL
OS_FINDINGS: FULL

## 2025-06-23 ASSESSMENT — REFRACTION_CURRENTRX
OS_VPRISM_DIRECTION: SV
OD_CYLINDER: SPHERE
OD_OVR_VA: 20/
OD_AXIS: 0
OD_SPHERE: PLANO
OS_OVR_VA: 20/
OS_AXIS: 165
OD_VPRISM_DIRECTION: SV
OS_CYLINDER: -4.00
OS_SPHERE: -3.25

## 2025-06-23 ASSESSMENT — VISUAL ACUITY
OS_BCVA: 20/HM
OD_BCVA: 20/400

## 2025-07-07 NOTE — ED BEHAVIORAL HEALTH ASSESSMENT NOTE - VETERAN
Start Victoza 0.6 mg daily x 2 weeks.      Send me a message in 2 weeks and let me know how things are going.      Can increase dose to 1.2 mg daily after 2 weeks, if tolerating well.     Cut your portions in half.  Listen to your body.  Stop eating as soon as you feel full.   Call or send [facility]atch message if you have side effects, nausea, or hypoglycemia.     Schedule labs- 24 hour urine collection.      Please have your lab tests done.  Please contact us to schedule at any of our [place] lab locations  Call [phone] ([phone]), select option 1 or schedule via [employer].     Please let me know if you are having low blood sugars less than 70 or over 250 mg/dL.      If you have concerns, please send me a Hatch message M-Th, call the clinic at [phone], or call [phone] after hours/weekends and ask to speak with the endocrinologist on call.              
No